# Patient Record
Sex: MALE | Race: WHITE | Employment: STUDENT | URBAN - METROPOLITAN AREA
[De-identification: names, ages, dates, MRNs, and addresses within clinical notes are randomized per-mention and may not be internally consistent; named-entity substitution may affect disease eponyms.]

---
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
## 2022-03-17 NOTE — PROGRESS NOTES
David Sutton is a 14year old male who was brought in for this visit. History was provided by Father    HPI:   Patient presents with: Allergies    Last 3 wks near daily nose bleeds - no hx of nasal trauma.  Spontenous - typically occurs after blowing Spoke with patient to verify if form is a duplicate as it has the same claim number as encounter 02/11/2022. Patient stated that the [place] would like form to include next appointment's date on it, but would like form to be filled as previously. No further questions.   Grandfather    • Cancer Maternal Grandfather        Current Medications    Current Outpatient Medications on File Prior to Visit:  Clindamycin Phosphate 1 % External Swab USE ONCE D ON CHEST AND BACK Disp:  Rfl: 5   Glucose Blood (ADAIR CONTOUR NEXT TEST) Neck supple. No tenderness is present. Cardiovascular: Normal rate, regular rhythm, S1 normal and S2 normal.  No murmur noted. Pulmonary/Chest: Effort normal. No retracting. Nontachypneic. Clear to auscultation. Good aeration throughout.      Skin: S and dust mite allergies:  · Keep indoor humidity at ~30-40%; higher levels can breed dust mites  · Keep stuffed animals to a minimum; wash in hot water every month if that is possible.  If not, tie them up in an airtight bag for 3 days or place them in a fa

## 2022-07-25 DIAGNOSIS — E10.9 TYPE 1 DIABETES MELLITUS WITHOUT COMPLICATION (CMD): Primary | ICD-10-CM

## 2022-07-25 RX ORDER — INSULIN ASPART 100 [IU]/ML
INJECTION, SOLUTION INTRAVENOUS; SUBCUTANEOUS
Qty: 90 ML | Refills: 3 | Status: SHIPPED | OUTPATIENT
Start: 2022-07-25 | End: 2022-12-21

## 2022-12-07 DIAGNOSIS — E10.9 TYPE 1 DIABETES MELLITUS WITHOUT COMPLICATION (CMD): Primary | ICD-10-CM

## 2022-12-08 RX ORDER — CALCIUM CARB/VITAMIN D3/VIT K1 500-100-40
TABLET,CHEWABLE ORAL
Qty: 100 EACH | Refills: 0 | Status: SHIPPED | OUTPATIENT
Start: 2022-12-08

## 2022-12-09 PROBLEM — E10.65 TYPE 1 DIABETES MELLITUS WITH HYPERGLYCEMIA  (CMD): Status: ACTIVE | Noted: 2022-12-09

## 2022-12-12 ENCOUNTER — APPOINTMENT (OUTPATIENT)
Dept: ENDOCRINOLOGY | Age: 22
End: 2022-12-12

## 2022-12-17 DIAGNOSIS — E10.9 TYPE 1 DIABETES MELLITUS WITHOUT COMPLICATION (CMD): ICD-10-CM

## 2022-12-20 ENCOUNTER — TELEPHONE (OUTPATIENT)
Dept: PEDIATRIC ENDOCRINOLOGY | Age: 22
End: 2022-12-20

## 2022-12-21 RX ORDER — INSULIN ASPART 100 [IU]/ML
INJECTION, SOLUTION INTRAVENOUS; SUBCUTANEOUS
Qty: 40 ML | Refills: 0 | Status: SHIPPED | OUTPATIENT
Start: 2022-12-21 | End: 2023-01-13

## 2022-12-29 ENCOUNTER — OFFICE VISIT (OUTPATIENT)
Dept: ENDOCRINOLOGY | Age: 22
End: 2022-12-29

## 2022-12-29 ENCOUNTER — LAB SERVICES (OUTPATIENT)
Dept: ENDOCRINOLOGY | Age: 22
End: 2022-12-29

## 2022-12-29 VITALS
WEIGHT: 188.49 LBS | HEART RATE: 84 BPM | DIASTOLIC BLOOD PRESSURE: 80 MMHG | SYSTOLIC BLOOD PRESSURE: 127 MMHG | TEMPERATURE: 98.6 F

## 2022-12-29 DIAGNOSIS — Z96.41 INSULIN PUMP IN PLACE: ICD-10-CM

## 2022-12-29 DIAGNOSIS — E10.65 TYPE 1 DIABETES MELLITUS WITH HYPERGLYCEMIA (CMD): ICD-10-CM

## 2022-12-29 DIAGNOSIS — E10.65 TYPE 1 DIABETES MELLITUS WITH HYPERGLYCEMIA (CMD): Primary | ICD-10-CM

## 2022-12-29 LAB — HBA1C MFR BLD: 6.9 % (ref 4.5–5.6)

## 2022-12-29 PROCEDURE — 82043 UR ALBUMIN QUANTITATIVE: CPT | Performed by: INTERNAL MEDICINE

## 2022-12-29 PROCEDURE — 80061 LIPID PANEL: CPT | Performed by: INTERNAL MEDICINE

## 2022-12-29 PROCEDURE — 36416 COLLJ CAPILLARY BLOOD SPEC: CPT | Performed by: INTERNAL MEDICINE

## 2022-12-29 PROCEDURE — 95251 CONT GLUC MNTR ANALYSIS I&R: CPT | Performed by: INTERNAL MEDICINE

## 2022-12-29 PROCEDURE — 84443 ASSAY THYROID STIM HORMONE: CPT | Performed by: INTERNAL MEDICINE

## 2022-12-29 PROCEDURE — 80053 COMPREHEN METABOLIC PANEL: CPT | Performed by: INTERNAL MEDICINE

## 2022-12-29 PROCEDURE — 36415 COLL VENOUS BLD VENIPUNCTURE: CPT | Performed by: INTERNAL MEDICINE

## 2022-12-29 PROCEDURE — 3074F SYST BP LT 130 MM HG: CPT | Performed by: INTERNAL MEDICINE

## 2022-12-29 PROCEDURE — 82570 ASSAY OF URINE CREATININE: CPT | Performed by: INTERNAL MEDICINE

## 2022-12-29 PROCEDURE — 99214 OFFICE O/P EST MOD 30 MIN: CPT | Performed by: INTERNAL MEDICINE

## 2022-12-29 PROCEDURE — 3079F DIAST BP 80-89 MM HG: CPT | Performed by: INTERNAL MEDICINE

## 2022-12-29 PROCEDURE — 83036 HEMOGLOBIN GLYCOSYLATED A1C: CPT | Performed by: INTERNAL MEDICINE

## 2022-12-29 RX ORDER — PROCHLORPERAZINE 25 MG/1
SUPPOSITORY RECTAL SEE ADMIN INSTRUCTIONS
COMMUNITY

## 2022-12-29 RX ORDER — GLUCAGON 3 MG/1
POWDER NASAL
Qty: 1 EACH | Refills: 1 | Status: SHIPPED | OUTPATIENT
Start: 2022-12-29

## 2022-12-29 RX ORDER — PROCHLORPERAZINE 25 MG/1
SUPPOSITORY RECTAL
COMMUNITY

## 2022-12-30 LAB
ALBUMIN SERPL-MCNC: 4.6 G/DL (ref 3.6–5.1)
ALBUMIN/GLOB SERPL: 1.4 {RATIO} (ref 1–2.4)
ALP SERPL-CCNC: 85 UNITS/L (ref 45–117)
ALT SERPL-CCNC: 28 UNITS/L
ANION GAP SERPL CALC-SCNC: 12 MMOL/L (ref 7–19)
AST SERPL-CCNC: 21 UNITS/L
BILIRUB SERPL-MCNC: 0.8 MG/DL (ref 0.2–1)
BUN SERPL-MCNC: 18 MG/DL (ref 6–20)
BUN/CREAT SERPL: 18 (ref 7–25)
CALCIUM SERPL-MCNC: 9.7 MG/DL (ref 8.4–10.2)
CHLORIDE SERPL-SCNC: 101 MMOL/L (ref 97–110)
CHOLEST SERPL-MCNC: 200 MG/DL
CHOLEST/HDLC SERPL: 2.7 {RATIO}
CO2 SERPL-SCNC: 31 MMOL/L (ref 21–32)
CREAT SERPL-MCNC: 0.99 MG/DL (ref 0.67–1.17)
CREAT UR-MCNC: 473 MG/DL
FASTING DURATION TIME PATIENT: 10 HOURS (ref 0–999)
FASTING DURATION TIME PATIENT: 10 HOURS (ref 0–999)
GFR SERPLBLD BASED ON 1.73 SQ M-ARVRAT: >90 ML/MIN
GLOBULIN SER-MCNC: 3.3 G/DL (ref 2–4)
GLUCOSE SERPL-MCNC: 137 MG/DL (ref 70–99)
HDLC SERPL-MCNC: 73 MG/DL
LDLC SERPL CALC-MCNC: 111 MG/DL
MICROALBUMIN UR-MCNC: 3.64 MG/DL
MICROALBUMIN/CREAT UR: 7.7 MG/G
NONHDLC SERPL-MCNC: 127 MG/DL
POTASSIUM SERPL-SCNC: 4.2 MMOL/L (ref 3.4–5.1)
PROT SERPL-MCNC: 7.9 G/DL (ref 6.4–8.2)
SODIUM SERPL-SCNC: 140 MMOL/L (ref 135–145)
TRIGL SERPL-MCNC: 81 MG/DL
TSH SERPL-ACNC: 2.37 MCUNITS/ML (ref 0.46–4.13)

## 2023-01-03 ENCOUNTER — TELEPHONE (OUTPATIENT)
Dept: PEDIATRIC ENDOCRINOLOGY | Age: 23
End: 2023-01-03

## 2023-01-13 DIAGNOSIS — E10.9 TYPE 1 DIABETES MELLITUS WITHOUT COMPLICATION (CMD): ICD-10-CM

## 2023-01-13 RX ORDER — INSULIN ASPART 100 [IU]/ML
INJECTION, SOLUTION INTRAVENOUS; SUBCUTANEOUS
Qty: 110 ML | Refills: 1 | Status: SHIPPED | OUTPATIENT
Start: 2023-01-13 | End: 2023-06-26 | Stop reason: SDUPTHER

## 2023-04-21 ENCOUNTER — OFFICE VISIT (OUTPATIENT)
Dept: FAMILY MEDICINE CLINIC | Facility: CLINIC | Age: 23
End: 2023-04-21

## 2023-04-21 VITALS
SYSTOLIC BLOOD PRESSURE: 127 MMHG | DIASTOLIC BLOOD PRESSURE: 81 MMHG | OXYGEN SATURATION: 98 % | HEART RATE: 89 BPM | WEIGHT: 190.19 LBS | HEIGHT: 71.3 IN | BODY MASS INDEX: 26.33 KG/M2

## 2023-04-21 DIAGNOSIS — Z71.3 ENCOUNTER FOR DIETARY COUNSELING AND SURVEILLANCE: ICD-10-CM

## 2023-04-21 DIAGNOSIS — E10.9 TYPE 1 DIABETES MELLITUS WITHOUT COMPLICATION (HCC): Primary | ICD-10-CM

## 2023-04-21 DIAGNOSIS — Z00.00 EXAMINATION, ROUTINE, OVER 18 YEARS OF AGE: ICD-10-CM

## 2023-04-21 DIAGNOSIS — Z71.82 EXERCISE COUNSELING: ICD-10-CM

## 2023-04-24 ENCOUNTER — TELEPHONE (OUTPATIENT)
Dept: PEDIATRIC ENDOCRINOLOGY | Age: 23
End: 2023-04-24

## 2023-04-25 ENCOUNTER — CLINICAL DOCUMENTATION (OUTPATIENT)
Dept: PEDIATRIC ENDOCRINOLOGY | Age: 23
End: 2023-04-25

## 2023-04-27 ENCOUNTER — TELEPHONE (OUTPATIENT)
Dept: PEDIATRIC ENDOCRINOLOGY | Age: 23
End: 2023-04-27

## 2023-06-12 PROBLEM — E10.65 TYPE 1 DIABETES MELLITUS WITH HYPERGLYCEMIA (HCC): Status: ACTIVE | Noted: 2022-12-09

## 2023-06-26 ENCOUNTER — OFFICE VISIT (OUTPATIENT)
Dept: ENDOCRINOLOGY | Age: 23
End: 2023-06-26

## 2023-06-26 VITALS
OXYGEN SATURATION: 99 % | WEIGHT: 190.15 LBS | BODY MASS INDEX: 26.62 KG/M2 | HEIGHT: 71 IN | HEART RATE: 74 BPM | SYSTOLIC BLOOD PRESSURE: 136 MMHG | TEMPERATURE: 97.2 F | DIASTOLIC BLOOD PRESSURE: 88 MMHG

## 2023-06-26 DIAGNOSIS — E10.65 TYPE 1 DIABETES MELLITUS WITH HYPERGLYCEMIA (CMD): Primary | ICD-10-CM

## 2023-06-26 DIAGNOSIS — Z46.81 INSULIN PUMP TITRATION: ICD-10-CM

## 2023-06-26 DIAGNOSIS — Z97.8 USES SELF-APPLIED CONTINUOUS GLUCOSE MONITORING DEVICE: ICD-10-CM

## 2023-06-26 LAB — HBA1C MFR BLD: 6.8 % (ref 4.5–5.6)

## 2023-06-26 PROCEDURE — 95251 CONT GLUC MNTR ANALYSIS I&R: CPT | Performed by: INTERNAL MEDICINE

## 2023-06-26 PROCEDURE — 36416 COLLJ CAPILLARY BLOOD SPEC: CPT | Performed by: INTERNAL MEDICINE

## 2023-06-26 PROCEDURE — 3079F DIAST BP 80-89 MM HG: CPT | Performed by: INTERNAL MEDICINE

## 2023-06-26 PROCEDURE — 83036 HEMOGLOBIN GLYCOSYLATED A1C: CPT | Performed by: INTERNAL MEDICINE

## 2023-06-26 PROCEDURE — 3075F SYST BP GE 130 - 139MM HG: CPT | Performed by: INTERNAL MEDICINE

## 2023-06-26 PROCEDURE — 99214 OFFICE O/P EST MOD 30 MIN: CPT | Performed by: INTERNAL MEDICINE

## 2023-06-26 RX ORDER — INSULIN ASPART 100 [IU]/ML
INJECTION, SOLUTION INTRAVENOUS; SUBCUTANEOUS
Qty: 110 ML | Refills: 1 | Status: SHIPPED | OUTPATIENT
Start: 2023-06-26

## 2023-07-26 ENCOUNTER — EXTERNAL RECORD (OUTPATIENT)
Dept: HEALTH INFORMATION MANAGEMENT | Facility: OTHER | Age: 23
End: 2023-07-26

## 2023-09-19 NOTE — PROGRESS NOTES
Dr Jeison Mccloud Endocrine Progress Notes pt from [address] [place] [address] for abdominal pain x1 month

## 2023-09-29 ENCOUNTER — OFFICE VISIT (OUTPATIENT)
Dept: ENDOCRINOLOGY | Age: 23
End: 2023-09-29

## 2023-09-29 ENCOUNTER — TELEPHONE (OUTPATIENT)
Dept: PEDIATRIC ENDOCRINOLOGY | Age: 23
End: 2023-09-29

## 2023-09-29 VITALS
HEART RATE: 76 BPM | OXYGEN SATURATION: 99 % | WEIGHT: 189.6 LBS | HEIGHT: 71 IN | BODY MASS INDEX: 26.54 KG/M2 | DIASTOLIC BLOOD PRESSURE: 75 MMHG | TEMPERATURE: 98.9 F | SYSTOLIC BLOOD PRESSURE: 114 MMHG

## 2023-09-29 DIAGNOSIS — Z97.8 USES SELF-APPLIED CONTINUOUS GLUCOSE MONITORING DEVICE: ICD-10-CM

## 2023-09-29 DIAGNOSIS — E10.65 TYPE 1 DIABETES MELLITUS WITH HYPERGLYCEMIA (CMD): Primary | ICD-10-CM

## 2023-09-29 DIAGNOSIS — Z96.41 PRESENCE OF HYBRID CLOSED-LOOP INSULIN PUMP SYSTEM: ICD-10-CM

## 2023-09-29 LAB — HBA1C MFR BLD: 7 % (ref 4.5–5.6)

## 2023-09-29 PROCEDURE — 95251 CONT GLUC MNTR ANALYSIS I&R: CPT | Performed by: INTERNAL MEDICINE

## 2023-09-29 PROCEDURE — 3074F SYST BP LT 130 MM HG: CPT | Performed by: INTERNAL MEDICINE

## 2023-09-29 PROCEDURE — 3078F DIAST BP <80 MM HG: CPT | Performed by: INTERNAL MEDICINE

## 2023-09-29 PROCEDURE — 36416 COLLJ CAPILLARY BLOOD SPEC: CPT | Performed by: INTERNAL MEDICINE

## 2023-09-29 PROCEDURE — 99214 OFFICE O/P EST MOD 30 MIN: CPT | Performed by: INTERNAL MEDICINE

## 2023-09-29 PROCEDURE — 83036 HEMOGLOBIN GLYCOSYLATED A1C: CPT | Performed by: INTERNAL MEDICINE

## 2023-11-02 ENCOUNTER — PATIENT MESSAGE (OUTPATIENT)
Dept: FAMILY MEDICINE CLINIC | Facility: CLINIC | Age: 23
End: 2023-11-02

## 2023-11-16 ENCOUNTER — TELEPHONE (OUTPATIENT)
Dept: FAMILY MEDICINE CLINIC | Facility: CLINIC | Age: 23
End: 2023-11-16

## 2023-11-16 NOTE — TELEPHONE ENCOUNTER
Due for   DM A1c  DM eye exam  DM foot exam  Pneumo covid flu     Completed   PX 4/21/2023   Tdap due 8/28/2024    See TE 11/16/2023 mychart message letter mailed

## 2024-01-01 DIAGNOSIS — E10.9 TYPE 1 DIABETES MELLITUS WITHOUT COMPLICATION (CMD): ICD-10-CM

## 2024-01-02 RX ORDER — CALCIUM CARB/VITAMIN D3/VIT K1 500-100-40
TABLET,CHEWABLE ORAL
Qty: 100 EACH | Refills: 1 | Status: SHIPPED | OUTPATIENT
Start: 2024-01-02

## 2024-01-03 ENCOUNTER — TELEPHONE (OUTPATIENT)
Dept: ENDOCRINOLOGY | Age: 24
End: 2024-01-03

## 2024-01-03 DIAGNOSIS — E10.65 TYPE 1 DIABETES MELLITUS WITH HYPERGLYCEMIA (CMD): Primary | ICD-10-CM

## 2024-01-03 RX ORDER — INSULIN ASPART 100 [IU]/ML
INJECTION, SOLUTION INTRAVENOUS; SUBCUTANEOUS
Qty: 110 ML | Refills: 1 | Status: SHIPPED | OUTPATIENT
Start: 2024-01-03

## 2024-01-18 ENCOUNTER — APPOINTMENT (OUTPATIENT)
Dept: ENDOCRINOLOGY | Age: 24
End: 2024-01-18

## 2024-01-29 ENCOUNTER — LAB SERVICES (OUTPATIENT)
Dept: ENDOCRINOLOGY | Age: 24
End: 2024-01-29

## 2024-01-29 DIAGNOSIS — E10.65 TYPE 1 DIABETES MELLITUS WITH HYPERGLYCEMIA (CMD): ICD-10-CM

## 2024-01-29 LAB
ALBUMIN SERPL-MCNC: 4.1 G/DL (ref 3.6–5.1)
ALBUMIN/GLOB SERPL: 1.5 {RATIO} (ref 1–2.4)
ALP SERPL-CCNC: 62 UNITS/L (ref 45–117)
ALT SERPL-CCNC: 26 UNITS/L
ANION GAP SERPL CALC-SCNC: 8 MMOL/L (ref 7–19)
AST SERPL-CCNC: 21 UNITS/L
BILIRUB SERPL-MCNC: 0.9 MG/DL (ref 0.2–1)
BUN SERPL-MCNC: 14 MG/DL (ref 6–20)
BUN/CREAT SERPL: 14 (ref 7–25)
CALCIUM SERPL-MCNC: 9.3 MG/DL (ref 8.4–10.2)
CHLORIDE SERPL-SCNC: 104 MMOL/L (ref 97–110)
CHOLEST SERPL-MCNC: 191 MG/DL
CHOLEST/HDLC SERPL: 2.9 {RATIO}
CO2 SERPL-SCNC: 29 MMOL/L (ref 21–32)
CREAT SERPL-MCNC: 1.01 MG/DL (ref 0.67–1.17)
EGFRCR SERPLBLD CKD-EPI 2021: >90 ML/MIN/{1.73_M2}
FASTING DURATION TIME PATIENT: 12 HOURS (ref 0–999)
GLOBULIN SER-MCNC: 2.8 G/DL (ref 2–4)
GLUCOSE SERPL-MCNC: 264 MG/DL (ref 70–99)
HDLC SERPL-MCNC: 67 MG/DL
LDLC SERPL CALC-MCNC: 110 MG/DL
NONHDLC SERPL-MCNC: 124 MG/DL
POTASSIUM SERPL-SCNC: 4.3 MMOL/L (ref 3.4–5.1)
PROT SERPL-MCNC: 6.9 G/DL (ref 6.4–8.2)
SODIUM SERPL-SCNC: 137 MMOL/L (ref 135–145)
T4 FREE SERPL-MCNC: 0.9 NG/DL (ref 0.8–1.5)
TRIGL SERPL-MCNC: 69 MG/DL
TSH SERPL-ACNC: 1.42 MCUNITS/ML (ref 0.35–5)

## 2024-01-29 PROCEDURE — 84439 ASSAY OF FREE THYROXINE: CPT | Performed by: CLINICAL MEDICAL LABORATORY

## 2024-01-29 PROCEDURE — 80061 LIPID PANEL: CPT | Performed by: CLINICAL MEDICAL LABORATORY

## 2024-01-29 PROCEDURE — 36415 COLL VENOUS BLD VENIPUNCTURE: CPT | Performed by: INTERNAL MEDICINE

## 2024-01-29 PROCEDURE — 82570 ASSAY OF URINE CREATININE: CPT | Performed by: CLINICAL MEDICAL LABORATORY

## 2024-01-29 PROCEDURE — 84443 ASSAY THYROID STIM HORMONE: CPT | Performed by: CLINICAL MEDICAL LABORATORY

## 2024-01-29 PROCEDURE — 80053 COMPREHEN METABOLIC PANEL: CPT | Performed by: CLINICAL MEDICAL LABORATORY

## 2024-01-29 PROCEDURE — 82043 UR ALBUMIN QUANTITATIVE: CPT | Performed by: CLINICAL MEDICAL LABORATORY

## 2024-01-30 ENCOUNTER — OFFICE VISIT (OUTPATIENT)
Dept: ENDOCRINOLOGY | Age: 24
End: 2024-01-30

## 2024-01-30 VITALS
OXYGEN SATURATION: 99 % | HEIGHT: 71 IN | SYSTOLIC BLOOD PRESSURE: 113 MMHG | BODY MASS INDEX: 27.27 KG/M2 | TEMPERATURE: 98.6 F | HEART RATE: 64 BPM | DIASTOLIC BLOOD PRESSURE: 79 MMHG | WEIGHT: 194.78 LBS

## 2024-01-30 DIAGNOSIS — E10.65 TYPE 1 DIABETES MELLITUS WITH HYPERGLYCEMIA (CMD): Primary | ICD-10-CM

## 2024-01-30 LAB
CREAT UR-MCNC: 158 MG/DL
HBA1C MFR BLD: 7.2 % (ref 4.5–5.6)
MICROALBUMIN UR-MCNC: 1.4 MG/DL
MICROALBUMIN/CREAT UR: 8.9 MG/G

## 2024-01-30 RX ORDER — INSULIN PMP CART,AUT,G6/7,CNTR
EACH SUBCUTANEOUS
Qty: 75 EACH | Refills: 1 | Status: SHIPPED | OUTPATIENT
Start: 2024-01-30

## 2024-03-13 ENCOUNTER — E-ADVICE (OUTPATIENT)
Dept: ENDOCRINOLOGY | Age: 24
End: 2024-03-13

## 2024-03-13 ENCOUNTER — TELEPHONE (OUTPATIENT)
Dept: ENDOCRINOLOGY | Age: 24
End: 2024-03-13

## 2024-03-27 ENCOUNTER — TELEPHONE (OUTPATIENT)
Dept: FAMILY MEDICINE CLINIC | Facility: CLINIC | Age: 24
End: 2024-03-27

## 2024-03-27 NOTE — TELEPHONE ENCOUNTER
Patient was called by CSS due to the reason of his appointment of \"feelings of sadness\" for tomorrow    One year ago saw Dr. Adams for mental health and discussed resources  Did try therapy and wanted another opinion and discuss medications  His parents recommended that he see Dr. Lawson  No thoughts of harming self or others, no plans in place to hurt self or SI  Has family and friends as a good support group  Patient feels safe and feels can wait to be seen tomorrow by Dr. Lawson  Informed patient that he can call our office or [facility]; can also walk in to [facility] if needs to talk to someone.  Patient states does have resources to call if needs to.  Appreciated the call.  Informed patient that he can call us back or reach out via [employer] if he would like additional phone numbers that we can provide for him to speak with (hot line)  Patient verbalized understanding   And thanked this nurse

## 2024-04-29 ENCOUNTER — OFFICE VISIT (OUTPATIENT)
Dept: FAMILY MEDICINE CLINIC | Facility: CLINIC | Age: 24
End: 2024-04-29
Payer: COMMERCIAL

## 2024-04-29 VITALS
BODY MASS INDEX: 26 KG/M2 | WEIGHT: 189 LBS | DIASTOLIC BLOOD PRESSURE: 73 MMHG | HEART RATE: 69 BPM | SYSTOLIC BLOOD PRESSURE: 138 MMHG

## 2024-04-29 DIAGNOSIS — F41.8 DEPRESSION WITH ANXIETY: ICD-10-CM

## 2024-04-29 DIAGNOSIS — E10.9 TYPE 1 DIABETES MELLITUS WITHOUT COMPLICATION (HCC): Primary | ICD-10-CM

## 2024-04-29 DIAGNOSIS — E55.9 VITAMIN D DEFICIENCY: ICD-10-CM

## 2024-04-29 DIAGNOSIS — Z00.00 ENCOUNTER FOR ANNUAL HEALTH EXAMINATION: ICD-10-CM

## 2024-04-29 NOTE — PROGRESS NOTES
No chief complaint on file.    HPI:   Hany Brady is a 23 year old male who presents to clinic for DM and anxiety follow up.   Mood has improved, less mind racing, less generalized anxiety. Seeing psychiatry and cbt.  Follows with endocrine for DM t1 control, amenable to making diet changes and plans on following with dietician.       REVIEW OF SYSTEMS:   Negative, except per HPI.     EXAM:   /73 (BP Location: Left arm, Patient Position: Sitting, Cuff Size: adult)   Pulse 69   Wt 189 lb (85.7 kg)   BMI 26.36 kg/m²   Body mass index is 26.36 kg/m².  GENERAL: well developed, well nourished, in no apparent distress  SKIN: no rashes, no suspicious lesions  HEENT: atraumatic, normocephalic  EYES: PERRLA, EOMI,conjunctiva are clear  NECK: supple, no adenopathy    ASSESSMENT AND PLAN:     1. Type 1 diabetes mellitus without complication (HCC)  - controlled, sees endo routinely. Seeing dietician  - CBC With Differential With Platelet; Future  - Comp Metabolic Panel (14); Future  - Hemoglobin A1C; Future  - Lipid Panel; Future  - TSH W Reflex To Free T4; Future  - Microalb/Creat Ratio, Random Urine [E]; Future    4. Depression with anxiety  - control improved with ssri + cbt + est care w psychiatry.     CPE labs ordered early.      RTC if no improvement in symptoms. Red flags discussed to go to [facility].     Dorene Lawson MD  4/29/2024  9:08 AM+

## 2024-04-30 ENCOUNTER — APPOINTMENT (OUTPATIENT)
Dept: ENDOCRINOLOGY | Age: 24
End: 2024-04-30

## 2024-04-30 VITALS
OXYGEN SATURATION: 99 % | HEART RATE: 86 BPM | DIASTOLIC BLOOD PRESSURE: 78 MMHG | TEMPERATURE: 97.5 F | SYSTOLIC BLOOD PRESSURE: 118 MMHG | BODY MASS INDEX: 26.88 KG/M2 | HEIGHT: 71 IN | WEIGHT: 192.02 LBS

## 2024-04-30 DIAGNOSIS — Z97.8 USES SELF-APPLIED CONTINUOUS GLUCOSE MONITORING DEVICE: ICD-10-CM

## 2024-04-30 DIAGNOSIS — Z46.81 INSULIN PUMP TITRATION: ICD-10-CM

## 2024-04-30 DIAGNOSIS — E10.65 TYPE 1 DIABETES MELLITUS WITH HYPERGLYCEMIA  (CMD): Primary | ICD-10-CM

## 2024-04-30 DIAGNOSIS — Z96.41 PRESENCE OF HYBRID CLOSED-LOOP INSULIN PUMP SYSTEM: ICD-10-CM

## 2024-04-30 LAB — HBA1C MFR BLD: 7.1 % (ref 4.5–5.6)

## 2024-04-30 RX ORDER — SERTRALINE HYDROCHLORIDE 25 MG/1
1 TABLET, FILM COATED ORAL DAILY
COMMUNITY
Start: 2024-03-28

## 2024-05-03 ENCOUNTER — TELEPHONE (OUTPATIENT)
Dept: FAMILY MEDICINE CLINIC | Facility: CLINIC | Age: 24
End: 2024-05-03

## 2024-05-13 ENCOUNTER — TELEPHONE (OUTPATIENT)
Dept: ENDOCRINOLOGY | Age: 24
End: 2024-05-13

## 2024-05-21 ENCOUNTER — LAB ENCOUNTER (OUTPATIENT)
Dept: LAB | Age: 24
End: 2024-05-21
Attending: FAMILY MEDICINE

## 2024-05-21 DIAGNOSIS — E10.9 TYPE 1 DIABETES MELLITUS WITHOUT COMPLICATION (HCC): ICD-10-CM

## 2024-05-21 DIAGNOSIS — Z00.00 ENCOUNTER FOR ANNUAL HEALTH EXAMINATION: ICD-10-CM

## 2024-05-21 DIAGNOSIS — E55.9 VITAMIN D DEFICIENCY: ICD-10-CM

## 2024-05-21 LAB
ALBUMIN SERPL-MCNC: 4.5 G/DL (ref 3.2–4.8)
ALBUMIN/GLOB SERPL: 1.7 {RATIO} (ref 1–2)
ALP LIVER SERPL-CCNC: 58 U/L
ALT SERPL-CCNC: 17 U/L
ANION GAP SERPL CALC-SCNC: 4 MMOL/L (ref 0–18)
AST SERPL-CCNC: 22 U/L (ref ?–34)
BASOPHILS # BLD AUTO: 0.04 X10(3) UL (ref 0–0.2)
BASOPHILS NFR BLD AUTO: 0.7 %
BILIRUB SERPL-MCNC: 1 MG/DL (ref 0.3–1.2)
BUN BLD-MCNC: 15 MG/DL (ref 9–23)
BUN/CREAT SERPL: 14.9 (ref 10–20)
CALCIUM BLD-MCNC: 9.4 MG/DL (ref 8.7–10.4)
CHLORIDE SERPL-SCNC: 106 MMOL/L (ref 98–112)
CHOLEST SERPL-MCNC: 156 MG/DL (ref ?–200)
CO2 SERPL-SCNC: 30 MMOL/L (ref 21–32)
CREAT BLD-MCNC: 1.01 MG/DL
CREAT UR-SCNC: 172.7 MG/DL
DEPRECATED RDW RBC AUTO: 40.4 FL (ref 35.1–46.3)
EGFRCR SERPLBLD CKD-EPI 2021: 109 ML/MIN/1.73M2 (ref 60–?)
EOSINOPHIL # BLD AUTO: 0.13 X10(3) UL (ref 0–0.7)
EOSINOPHIL NFR BLD AUTO: 2.4 %
ERYTHROCYTE [DISTWIDTH] IN BLOOD BY AUTOMATED COUNT: 12.3 % (ref 11–15)
EST. AVERAGE GLUCOSE BLD GHB EST-MCNC: 146 MG/DL (ref 68–126)
FASTING PATIENT LIPID ANSWER: YES
FASTING STATUS PATIENT QL REPORTED: YES
GLOBULIN PLAS-MCNC: 2.7 G/DL (ref 2–3.5)
GLUCOSE BLD-MCNC: 125 MG/DL (ref 70–99)
HBA1C MFR BLD: 6.7 % (ref ?–5.7)
HCT VFR BLD AUTO: 45 %
HDLC SERPL-MCNC: 59 MG/DL (ref 40–59)
HGB BLD-MCNC: 15.5 G/DL
IMM GRANULOCYTES # BLD AUTO: 0.02 X10(3) UL (ref 0–1)
IMM GRANULOCYTES NFR BLD: 0.4 %
LDLC SERPL CALC-MCNC: 87 MG/DL (ref ?–100)
LYMPHOCYTES # BLD AUTO: 1.96 X10(3) UL (ref 1–4)
LYMPHOCYTES NFR BLD AUTO: 35.8 %
MCH RBC QN AUTO: 30.8 PG (ref 26–34)
MCHC RBC AUTO-ENTMCNC: 34.4 G/DL (ref 31–37)
MCV RBC AUTO: 89.3 FL
MICROALBUMIN UR-MCNC: 1 MG/DL
MICROALBUMIN/CREAT 24H UR-RTO: 5.8 UG/MG (ref ?–30)
MONOCYTES # BLD AUTO: 0.35 X10(3) UL (ref 0.1–1)
MONOCYTES NFR BLD AUTO: 6.4 %
NEUTROPHILS # BLD AUTO: 2.97 X10 (3) UL (ref 1.5–7.7)
NEUTROPHILS # BLD AUTO: 2.97 X10(3) UL (ref 1.5–7.7)
NEUTROPHILS NFR BLD AUTO: 54.3 %
NONHDLC SERPL-MCNC: 97 MG/DL (ref ?–130)
OSMOLALITY SERPL CALC.SUM OF ELEC: 292 MOSM/KG (ref 275–295)
PLATELET # BLD AUTO: 270 10(3)UL (ref 150–450)
POTASSIUM SERPL-SCNC: 4.1 MMOL/L (ref 3.5–5.1)
PROT SERPL-MCNC: 7.2 G/DL (ref 5.7–8.2)
RBC # BLD AUTO: 5.04 X10(6)UL
SODIUM SERPL-SCNC: 140 MMOL/L (ref 136–145)
TRIGL SERPL-MCNC: 48 MG/DL (ref 30–149)
TSI SER-ACNC: 1.3 MIU/ML (ref 0.55–4.78)
VIT D+METAB SERPL-MCNC: 25.5 NG/ML (ref 30–100)
VLDLC SERPL CALC-MCNC: 8 MG/DL (ref 0–30)
WBC # BLD AUTO: 5.5 X10(3) UL (ref 4–11)

## 2024-05-21 PROCEDURE — 82570 ASSAY OF URINE CREATININE: CPT

## 2024-05-21 PROCEDURE — 36415 COLL VENOUS BLD VENIPUNCTURE: CPT

## 2024-05-21 PROCEDURE — 85025 COMPLETE CBC W/AUTO DIFF WBC: CPT

## 2024-05-21 PROCEDURE — 82306 VITAMIN D 25 HYDROXY: CPT

## 2024-05-21 PROCEDURE — 84443 ASSAY THYROID STIM HORMONE: CPT

## 2024-05-21 PROCEDURE — 83036 HEMOGLOBIN GLYCOSYLATED A1C: CPT

## 2024-05-21 PROCEDURE — 82043 UR ALBUMIN QUANTITATIVE: CPT

## 2024-05-21 PROCEDURE — 80061 LIPID PANEL: CPT

## 2024-05-21 PROCEDURE — 80053 COMPREHEN METABOLIC PANEL: CPT

## 2024-05-29 ENCOUNTER — EXTERNAL RECORD (OUTPATIENT)
Dept: HEALTH INFORMATION MANAGEMENT | Facility: OTHER | Age: 24
End: 2024-05-29

## 2024-05-29 LAB — RETINOPATHY PRESENT (RTP): NO

## 2024-06-03 ENCOUNTER — OFFICE VISIT (OUTPATIENT)
Dept: FAMILY MEDICINE CLINIC | Facility: CLINIC | Age: 24
End: 2024-06-03
Payer: COMMERCIAL

## 2024-06-03 VITALS
BODY MASS INDEX: 26 KG/M2 | HEART RATE: 83 BPM | DIASTOLIC BLOOD PRESSURE: 75 MMHG | WEIGHT: 188 LBS | SYSTOLIC BLOOD PRESSURE: 119 MMHG

## 2024-06-03 DIAGNOSIS — Z23 NEED FOR PNEUMOCOCCAL VACCINE: ICD-10-CM

## 2024-06-03 DIAGNOSIS — M25.561 ACUTE PAIN OF BOTH KNEES: ICD-10-CM

## 2024-06-03 DIAGNOSIS — Z00.00 ENCOUNTER FOR ANNUAL HEALTH EXAMINATION: Primary | ICD-10-CM

## 2024-06-03 DIAGNOSIS — M25.562 ACUTE PAIN OF BOTH KNEES: ICD-10-CM

## 2024-06-03 PROCEDURE — 90677 PCV20 VACCINE IM: CPT | Performed by: FAMILY MEDICINE

## 2024-06-03 PROCEDURE — 3061F NEG MICROALBUMINURIA REV: CPT | Performed by: FAMILY MEDICINE

## 2024-06-03 PROCEDURE — 3044F HG A1C LEVEL LT 7.0%: CPT | Performed by: FAMILY MEDICINE

## 2024-06-03 PROCEDURE — 90471 IMMUNIZATION ADMIN: CPT | Performed by: FAMILY MEDICINE

## 2024-06-03 PROCEDURE — 3074F SYST BP LT 130 MM HG: CPT | Performed by: FAMILY MEDICINE

## 2024-06-03 PROCEDURE — 3078F DIAST BP <80 MM HG: CPT | Performed by: FAMILY MEDICINE

## 2024-06-03 PROCEDURE — 99395 PREV VISIT EST AGE 18-39: CPT | Performed by: FAMILY MEDICINE

## 2024-06-03 NOTE — PROGRESS NOTES
Hany Brady is a 23 year old male who presents for a complete physical exam.   HPI:   Working with a nutritionist and drinking water.   Starting massage therapy classes.   History of acute right quadriceps tear, would like to start physical therapy to help prevent knee injury as he is feeling pain in both knees.  Mood is stable.  Seeing psychiatry.  Currently taking sertraline total 50 mg daily.    Wt Readings from Last 3 Encounters:   06/03/24 188 lb (85.3 kg)   04/29/24 189 lb (85.7 kg)   03/28/24 189 lb (85.7 kg)     Body mass index is 26.22 kg/m².     Current Outpatient Medications   Medication Sig Dispense Refill    sertraline 25 MG Oral Tab Take 1 tablet (25 mg total) by mouth daily. (Patient taking differently: Take 1 tablet (25 mg total) by mouth in the morning and 1 tablet (25 mg total) before bedtime.) 30 tablet 0    BAQSIMI ONE PACK 3 MG/DOSE Nasal nasal powder       NOVOLOG 100 UNIT/ML Injection Solution INJECT 120 UNITS PER DAY CONTINOUSLY VIA INSULIN PUMP      Insulin Regular Human (INSULIN VIA INSULIN PUMP) Inject into the skin 4 (four) times daily before meals and nightly.      GLUCAGON EMERGENCY 1 MG Injection Kit USE 0.5 MG ONCE UTD  0    Insulin Syringe 31G X 5/16\" 0.3 ML Does not apply Misc USE D UTD  3    Glucose Blood (ADAIR CONTOUR NEXT TEST) In Vitro Strip TEST UTD 10 TIMES A DAY  2    BD PEN NEEDLE SHORT U/F 31G X 8 MM Does not apply Misc USE 6 TIMES PER DAY  6      Past Medical History:    Concussion    10/15 - no LOC, no ER visit, 2nd mild 3/16 - no LOC    Type 1 diabetes mellitus (HCC)    Varicella    disease at 18 months      History reviewed. No pertinent surgical history.   Family History   Problem Relation Age of Onset    Hypertension Father     Psychiatric Father     Diabetes Father     Other (hip dysplasia) Sister     Other (hip dysplasia) Other         family h    Colon Cancer Other         family h/o Mom' side    Glaucoma Paternal Grandfather     Macular degeneration  Paternal Grandfather     Cancer Paternal Grandfather     Cancer Maternal Grandfather     Heart Disorder Maternal Grandfather 82        pacemaker      Social History:  Social History     Socioeconomic History    Marital status: Single   Tobacco Use    Smoking status: Never     Passive exposure: Never    Smokeless tobacco: Never   Vaping Use    Vaping status: Never Used   Substance and Sexual Activity    Alcohol use: No    Drug use: No           REVIEW OF SYSTEMS:   Negative, except per HPI.     EXAM:   /75   Pulse 83   Wt 188 lb (85.3 kg)   BMI 26.22 kg/m²     GENERAL: well developed, well nourished, in no apparent distress  SKIN: no rashes, no suspicious lesions  HEENT: atraumatic, normocephalic, ears are clear  EYES: PERRLA, EOMI, conjunctiva are clear  NECK: supple, no adenopathy  LUNGS: clear to auscultation  CARDIO: RRR without murmur  GI: good BS, no masses, HSM or tenderness  MUSCULOSKELETAL: back is not tender, FROM of the back  EXTREMITIES: no cyanosis, clubbing or edema  NEURO: Oriented times three, cranial nerves are intact, motor and sensory are grossly intact    ASSESSMENT AND PLAN:   Hany Brady is a 23 year old male who presents for a complete physical exam.  1. Encounter for annual health examination  -Medical, surgical and social history, as well as medications and allergies were reviewed with patient.  Labs reviewed with patient done prior to appointment    2. Need for pneumococcal vaccine  - Prevnar 20 (PCV20) [39220]    3. Acute pain of both knees  - Physical Therapy Referral - [facility] [place]      RTC if no improvement in symptoms. Red flags discussed to go to ER.      Dorene Lawson MD  6/3/2024  10:02 AM

## 2024-06-19 ENCOUNTER — TELEPHONE (OUTPATIENT)
Dept: PEDIATRIC ENDOCRINOLOGY | Age: 24
End: 2024-06-19

## 2024-06-19 DIAGNOSIS — E10.65 TYPE 1 DIABETES MELLITUS WITH HYPERGLYCEMIA  (CMD): ICD-10-CM

## 2024-06-19 RX ORDER — INSULIN ASPART 100 [IU]/ML
INJECTION, SOLUTION INTRAVENOUS; SUBCUTANEOUS
Qty: 110 ML | Refills: 1 | Status: SHIPPED | OUTPATIENT
Start: 2024-06-19

## 2024-07-02 ENCOUNTER — TELEPHONE (OUTPATIENT)
Dept: PHYSICAL THERAPY | Facility: HOSPITAL | Age: 24
End: 2024-07-02

## 2024-07-06 DIAGNOSIS — E10.65 TYPE 1 DIABETES MELLITUS WITH HYPERGLYCEMIA  (CMD): ICD-10-CM

## 2024-07-09 ENCOUNTER — E-ADVICE (OUTPATIENT)
Dept: ENDOCRINOLOGY | Age: 24
End: 2024-07-09

## 2024-07-09 RX ORDER — INSULIN ASPART 100 [IU]/ML
INJECTION, SOLUTION INTRAVENOUS; SUBCUTANEOUS
Qty: 110 ML | Refills: 1 | OUTPATIENT
Start: 2024-07-09

## 2024-07-16 ENCOUNTER — TELEPHONE (OUTPATIENT)
Dept: PHYSICAL THERAPY | Facility: HOSPITAL | Age: 24
End: 2024-07-16

## 2024-07-16 NOTE — PROGRESS NOTES
LOWER EXTREMITY EVALUATION:     Diagnosis:   Acute pain of both knees (M25.561,M25.562)       Referring Provider: Dorene Lawson  Date of Evaluation:    7/17/2024    Precautions:   Type 1 Diabetes Next MD visit:   none scheduled  Date of Surgery: n/a     PATIENT SUMMARY   Hany Brady is a 24 year old male who presents to therapy today with complaints of bilateral knee pain, R>L. Pt had a right quad tear a few years ago during soccer; didn't realize it was torn for a few weeks or months. Pt is typically very active playing soccer, running, and lifting. Has eased off due to the increasing knee pain. Also reports increased discomfort with prolonged sitting. Insidious onset of pain was about a year ago without a specific FERNANDEZ. Pt knows his hip and knee muscles are very tight.   Pt denies popping, clicking, and buckling in the knees. Pt denies any swelling.     Pt describes pain level current 0/10, at best 0/10, at worst 7/10.   Current functional limitations include playing soccer, running, lifting, prolonged sitting, driving, and when bent over or in a squat.     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 Pt goals include \"My expectation is to follow a plan of care that helps alleviate me from my symptoms, and be given instruction how to do that in my own time. My ultimate goal is to return to exercising and leg bending activities with as little knee pain as possible.\"  Past medical history was reviewed with Hany. Significant findings include  has a past medical history of Concussion, Type 1 diabetes mellitus (HCC), and Varicella.     ASSESSMENT  Hany presents to physical therapy evaluation with primary c/o bilateral knee pain, R>L. The results of the objective tests and measures show diffuse infrapatellar swelling, mild ROM limitations in the right knee, marked LE muscle  tightness, and positive Lachman on the right and anterior drawer bilaterally.  Functional deficits include but are not limited to soccer, running, lifting, prolonged sitting, driving, and when bent over or in a squat. Signs and symptoms are consistent with diagnosis of patellofemoral dysfunction and possible chronic ACL tear. Pt and PT discussed evaluation findings, pathology, POC and HEP.  Pt voiced understanding and performs HEP correctly without reported pain. Skilled Physical Therapy is medically necessary to address the above impairments and reach functional goals.     OBJECTIVE:   Observation: mild swelling at the medial joint line on the right knee compared to the left knee.   Palpation: diffuse swelling infrapatellar region on the right; no marked tenderness   Sensation: denies numbness and tingling    AROM: (* denotes performed with pain)  Knee   Flexion: R 127; L 131  Extension: R +1; L +1         Accessory motion: limited superior patellar glide on R, hypermobile inferior and medial glides; limited superior patellar glide on L and hypermobility otherwise     Flexibility:  Hip Flexor: R mod, L mod  Hamstrings: R 46 SLR; L 54 SLR  Quads: R mod**; L mod*    Strength/MMT: (* denotes performed with pain)  Hip Knee   Flexion: R 5/5* (quad straining); L 5/5  Extension: R 5/5 (fasciculations); L 4+/5 (fasciculations)  Abduction: R 5/5; L 5/5  ER: R 4+/5; L 5-/5    (Fasciculations occurred on contralateral leg with MMT) Flexion: R 5-/5* (Straining in HS); L 5-/5* (straining in HS)  Extension: R 5/5 (fasciculations); L 5/5 (fasciculations)          Special tests:   Instability  Lachman: R +; L -  Anterior drawer: R +; L +  Posterior drawer: R -; L -  Valgus Stress: R -; L -  Varus stress: R -; L -    Meniscus  Shukri: R -; L -    Patellofemoral   Patellar apprehension: R -; L -  Patellar tilt: R -; L -    Gait: pt ambulates on level ground with normal mechanics  Balance: SLS: R >30 sec, L >30 sec    Today’s  Treatment and Response:   Pt education was provided on exam findings, treatment diagnosis, treatment plan, expectations, and prognosis. Pt was also provided recommendations for activity modifications, possible soreness after evaluation, modalities as needed [ice/heat], and importance of remaining active.  Patient was instructed in and issued a HEP for: KRBQHZDT  - Hip Flexion  - 2-3 x daily - 7 x weekly - 10 reps  - Supine Quad Set  - 2 x daily - 7 x weekly - 10 reps - 5-10 sec hold  - Seated Hamstring Set  - 2 x daily - 7 x weekly - 10 reps - 5-10 sec hold  - Seated Hamstring Stretch  - 3-4 x daily - 7 x weekly - 4 sets - 10 sec hold    Charges: PT Eval Low Complexity, 1 TE      Total Timed Treatment: 15 min     Total Treatment Time: 43 min     Based on clinical rationale and outcome measures, this evaluation involved Low Complexity decision making due to 1-2 personal factors/comorbidities, 3 body structures involved/activity limitations, and evolving symptoms including changing pain levels.  PLAN OF CARE:    Goals: (to be met in 10-12 visits)  Pt will improve knee AROM flexion to >130 degrees to improve ability to perform soccer.   Pt will improve quad strength to 5/5 to ascend 1 flight of stairs reciprocally without UE assist   Pt will increase hip and knee strength to grossly 5/5 to improve stability in the knees to perform squats.  Pt will demonstrate squatting activities without excessive femoral IR/ADD and minimal to no knee pain.  Pt will be independent and compliant with comprehensive HEP to maintain progress achieved in PT      Frequency / Duration: Patient will be seen for 1-2 x/week or a total of 10-12 visits over a 90 day period. Treatment will include: Manual Therapy, Neuromuscular Re-education, Therapeutic Exercise, and Home Exercise Program instruction    Education or treatment limitation: None  Rehab Potential:good    LEFS Score  LEFS Score: 68.75 % (7/16/2024 11:05 AM)      Patient/Family/Caregiver  was advised of these findings, precautions, and treatment options and has agreed to actively participate in planning and for this course of care.    Thank you for your referral. Please co-sign or sign and return this letter via fax as soon as possible to [phone]. If you have any questions, please contact me at Dept: [phone]    Sincerely,  Electronically signed by therapist: Kendall Wise PT  Physician's certification required: Yes  I certify the need for these services furnished under this plan of treatment and while under my care.    X___________________________________________________ Date____________________    Certification From: 7/16/2024  To:10/14/2024

## 2024-07-17 ENCOUNTER — OFFICE VISIT (OUTPATIENT)
Dept: PHYSICAL THERAPY | Facility: HOSPITAL | Age: 24
End: 2024-07-17
Attending: FAMILY MEDICINE
Payer: COMMERCIAL

## 2024-07-17 DIAGNOSIS — M25.561 ACUTE PAIN OF BOTH KNEES: Primary | ICD-10-CM

## 2024-07-17 DIAGNOSIS — M25.562 ACUTE PAIN OF BOTH KNEES: Primary | ICD-10-CM

## 2024-07-17 PROCEDURE — 97161 PT EVAL LOW COMPLEX 20 MIN: CPT

## 2024-07-17 PROCEDURE — 97110 THERAPEUTIC EXERCISES: CPT

## 2024-07-22 NOTE — PROGRESS NOTES
Diagnosis:   Acute pain of both knees (M25.561,M25.562)       Referring Provider: Dorene Lawson  Date of Evaluation:    7/17/2024    Precautions:   Type 1 Diabetes Next MD visit:   none scheduled  Date of Surgery: n/a     Insurance Primary/Secondary: [employer] [place] PPO / N/A     # Auth Visits: n/a            Subjective: Pt said he felt good after last time. Has been doing the exercises 1x a day at least. Played soccer Sat and Sun didn't feel too bad after. Felt more soreness in the R knee but not as constant and symptoms did not persist as long as usual.  Last MRI was March of 2022.  Pain: 0/10      Objective: Rx: see flowsheet    AROM: (* denotes performed with pain)  Knee   Flexion: R 127; L 131  Extension: R +1; L +1         Accessory motion: limited superior patellar glide on R, hypermobile inferior and medial glides; limited superior patellar glide on L and hypermobility otherwise     Flexibility:  Hip Flexor: R mod, L mod  Hamstrings: R 46 SLR; L 54 SLR  Quads: R mod**; L mod*    Strength/MMT: (* denotes performed with pain)  Hip Knee   Flexion: R 5/5* (quad straining); L 5/5  Extension: R 5/5 (fasciculations); L 4+/5 (fasciculations)  Abduction: R 5/5; L 5/5  ER: R 4+/5; L 5-/5    (Fasciculations occurred on contralateral leg with MMT) Flexion: R 5-/5* (Straining in HS); L 5-/5* (straining in HS)  Extension: R 5/5 (fasciculations); L 5/5 (fasciculations)        Special tests:   Lachman: R +; L -  Anterior drawer: R +; L +      Assessment: Pt responded well to use of IASTM to bilateral quad and HS's. More tension noted along the left quad vs the right, but notable tension along bilateral hamstrings more toward the mid posterior thigh. Less fasciculations noted with muscle activation today, but primarily focused on gentle stretching and ROM with focus on motor control than strengthening.       Goals:   Pt will improve knee AROM flexion to >130 degrees to improve ability to perform soccer.   Pt will improve quad  strength to 5/5 to ascend 1 flight of stairs reciprocally without UE assist   Pt will increase hip and knee strength to grossly 5/5 to improve stability in the knees to perform squats.  Pt will demonstrate squatting activities without excessive femoral IR/ADD and minimal to no knee pain.  Pt will be independent and compliant with comprehensive HEP to maintain progress achieved in PT     Plan: Patient will be seen for 1-2 x/week or a total of 10-12 visits over a 90 day period. Treatment will include: Manual Therapy, Neuromuscular Re-education, Therapeutic Exercise, and Home Exercise Program instruction  Date: 7/23/2024  TX#: 2/10-12 Date:                 TX#: 3/ Date:                 TX#: 4/ Date:                 TX#: 5/ Date:   Tx#: 6/   MT x25'  Patellar mobilizations: superior GII-III  IASTM bilat quads and HS    TE x13'  Prone quad stretch 10x10\" bilat  Supine HS stretch with green strap (HELD)  Seated HS stretch 10x10\" bilat  Gastroc/Soleus stretching on slant board 5x10\" ea    NR x10'  Prone knee flexion with ball between ankles 2x10   Standing TKE RTB (25-50% activation) 5\" hold x10 bilat       HEP: KRBQHZDT  - Hip Flexion  - 2-3 x daily - 7 x weekly - 10 reps  - Supine Quad Set  - 2 x daily - 7 x weekly - 10 reps - 5-10 sec hold  - Seated Hamstring Set  - 2 x daily - 7 x weekly - 10 reps - 5-10 sec hold  - Seated Hamstring Stretch  - 3-4 x daily - 7 x weekly - 4 sets - 10 sec hold    Charges: 2 MT 1 NR 1 TE       Total Timed Treatment: 48 min  Total Treatment Time: 48 min

## 2024-07-23 ENCOUNTER — OFFICE VISIT (OUTPATIENT)
Dept: PHYSICAL THERAPY | Facility: HOSPITAL | Age: 24
End: 2024-07-23
Attending: FAMILY MEDICINE
Payer: COMMERCIAL

## 2024-07-23 PROCEDURE — 97140 MANUAL THERAPY 1/> REGIONS: CPT

## 2024-07-23 PROCEDURE — 97110 THERAPEUTIC EXERCISES: CPT

## 2024-07-23 PROCEDURE — 97112 NEUROMUSCULAR REEDUCATION: CPT

## 2024-07-24 NOTE — PROGRESS NOTES
Diagnosis:   Acute pain of both knees (M25.561,M25.562)       Referring Provider: Dorene Lawson  Date of Evaluation:    7/17/2024    Precautions:   Type 1 Diabetes Next MD visit:   none scheduled  Date of Surgery: n/a     Insurance Primary/Secondary: [employer] [place] / N/A     # Auth Visits: n/a            Subjective: Pt said he felt fine after last session.   Last MRI was March of 2022.  Pain: 0/10      Objective: Rx: see flowsheet    AROM: (* denotes performed with pain)  Knee   Flexion: R 127; L 131  Extension: R +1; L +1         Accessory motion: limited superior patellar glide on R, hypermobile inferior and medial glides; limited superior patellar glide on L and hypermobility otherwise     Flexibility:  Hip Flexor: R mod, L mod  Hamstrings: R 46 SLR; L 54 SLR  Quads: R mod**; L mod*    Strength/MMT: (* denotes performed with pain)  Hip Knee   Flexion: R 5/5* (quad straining); L 5/5  Extension: R 5/5 (fasciculations); L 4+/5 (fasciculations)  Abduction: R 5/5; L 5/5  ER: R 4+/5; L 5-/5    (Fasciculations occurred on contralateral leg with MMT) Flexion: R 5-/5* (Straining in HS); L 5-/5* (straining in HS)  Extension: R 5/5 (fasciculations); L 5/5 (fasciculations)        Special tests:   Lachman: R +; L -  Anterior drawer: R +; L +      Assessment: Ongoing fasciculations with quad/HS activation, though appears to be decreased since the initial visit. Emphasis on breathing with controlled muscle activation with exercises today to improve motor control and endurance.       Goals:   Pt will improve knee AROM flexion to >130 degrees to improve ability to perform soccer.   Pt will improve quad strength to 5/5 to ascend 1 flight of stairs reciprocally without UE assist   Pt will increase hip and knee strength to grossly 5/5 to improve stability in the knees to perform squats.  Pt will demonstrate squatting activities without excessive femoral IR/ADD and minimal to no knee pain.  Pt will be independent and compliant with  comprehensive HEP to maintain progress achieved in PT     Plan: Patient will be seen for 1-2 x/week or a total of 10-12 visits over a 90 day period. Treatment will include: Manual Therapy, Neuromuscular Re-education, Therapeutic Exercise, and Home Exercise Program instruction  Date: 7/23/2024  TX#: 2/10-12 Date: 7/25/24                TX#: 3/10-12 Date:                 TX#: 4/ Date:                 TX#: 5/ Date:   Tx#: 6/   MT x25'  Patellar mobilizations: superior GII-III  IASTM bilat quads and HS    TE x13'  Prone quad stretch 10x10\" bilat  Supine HS stretch with green strap (HELD)  Seated HS stretch 10x10\" bilat  Gastroc/Soleus stretching on slant board 5x10\" ea    NR x10'  Prone knee flexion with ball between ankles 2x10   Standing TKE RTB (25-50% activation) 5\" hold x10 bilat MT x10'  Seated tib-fem traction GII bilat  Patellar mobilizations: all directions GII-III bilat    TE x23'  Bridge 2x10  Hook lying piriformis stretch 4x10\" bilat  Hook lying figure 4 stretch 4x10\" bilat  1/2 knee hip flexor stretch (HELD)  Mod 1/2 kneel on plinth 4x10\" bilat  Shuttle SLP 42# x12    NR x12'  SAQ 2x10 bilat   SL clam x10 bilat (slow and controlled with breathing)  Prone knee flexion with ball between ankles 2x10       HEP: KRBQHZDT  - Hip Flexion  - 2-3 x daily - 7 x weekly - 10 reps  - Supine Quad Set  - 2 x daily - 7 x weekly - 10 reps - 5-10 sec hold  - Seated Hamstring Set  - 2 x daily - 7 x weekly - 10 reps - 5-10 sec hold  - Seated Hamstring Stretch  - 3-4 x daily - 7 x weekly - 4 sets - 10 sec hold  - Supine Piriformis Stretch with Foot on Ground  - 2-3 x daily - 7 x weekly - 3 sets - 10 sec hold  - Supine Figure 4 Piriformis Stretch  - 2-3 x daily - 7 x weekly - 3 sets - 10 sec hold  - Seated Piriformis Stretch  - 2-3 x daily - 7 x weekly - 3 sets - 10 sec hold  - Seated Piriformis Stretch with Trunk Bend  - 2-3 x daily - 7 x weekly - 3 sets - 10 sec hold    Charges: 1 MT 1 NR 2 TE       Total Timed Treatment: 45  min  Total Treatment Time: 45 min

## 2024-07-25 ENCOUNTER — OFFICE VISIT (OUTPATIENT)
Dept: PHYSICAL THERAPY | Facility: HOSPITAL | Age: 24
End: 2024-07-25
Attending: FAMILY MEDICINE
Payer: COMMERCIAL

## 2024-07-25 PROCEDURE — 97140 MANUAL THERAPY 1/> REGIONS: CPT

## 2024-07-25 PROCEDURE — 97112 NEUROMUSCULAR REEDUCATION: CPT

## 2024-07-25 PROCEDURE — 97110 THERAPEUTIC EXERCISES: CPT

## 2024-07-26 NOTE — PROGRESS NOTES
Diagnosis:   Acute pain of both knees (M25.561,M25.562)       Referring Provider: Dorene Lawson  Date of Evaluation:    7/17/2024    Precautions:   Type 1 Diabetes Next MD visit:   none scheduled  Date of Surgery: n/a     Insurance Primary/Secondary: [employer] [place] P[place] / N/A     # Auth Visits: n/a            Subjective: Pt said he felt less knee pain than normal after playing soccer yesterday. Playing was fine, but felt discomfort in the knees after due to tightness.   Last MRI was March of 2022.  Pain: 0/10      Objective: Rx: see flowsheet    AROM: (* denotes performed with pain)  Knee   Flexion: R 127; L 131  Extension: R +1; L +1         Accessory motion: limited superior patellar glide on R, hypermobile inferior and medial glides; limited superior patellar glide on L and hypermobility otherwise     Flexibility:  Hip Flexor: R mod, L mod  Hamstrings: R 46 SLR; L 54 SLR  Quads: R mod**; L mod*    Strength/MMT: (* denotes performed with pain)  Hip Knee   Flexion: R 5/5* (quad straining); L 5/5  Extension: R 5/5 (fasciculations); L 4+/5 (fasciculations)  Abduction: R 5/5; L 5/5  ER: R 4+/5; L 5-/5    (Fasciculations occurred on contralateral leg with MMT) Flexion: R 5-/5* (Straining in HS); L 5-/5* (straining in HS)  Extension: R 5/5 (fasciculations); L 5/5 (fasciculations)        Special tests:   Lachman: R +; L -  Anterior drawer: R +; L +      Assessment: Pt responded well to contract-relax for muscle relaxation and stretching. Reviewed squatting form, he demonstrated increased weight bearing toward the right but was able to correct with cues. Pt did well with closed chain strengthening today- will continue to progress as able.      Goals:   Pt will improve knee AROM flexion to >130 degrees to improve ability to perform soccer.   Pt will improve quad strength to 5/5 to ascend 1 flight of stairs reciprocally without UE assist   Pt will increase hip and knee strength to grossly 5/5 to improve stability in the knees  to perform squats.  Pt will demonstrate squatting activities without excessive femoral IR/ADD and minimal to no knee pain.  Pt will be independent and compliant with comprehensive HEP to maintain progress achieved in PT     Plan: Patient will be seen for 1-2 x/week or a total of 10-12 visits over a 90 day period. Treatment will include: Manual Therapy, Neuromuscular Re-education, Therapeutic Exercise, and Home Exercise Program instruction  Mini ayesha hartman with slider   Date: 7/23/2024  TX#: 2/10-12 Date: 7/25/24                TX#: 3/10-12 Date: 7/29/24                TX#: 4/10-12 Date:                 TX#: 5/ Date:   Tx#: 6/   MT x25'  Patellar mobilizations: superior GII-III  IASTM bilat quads and HS    TE x13'  Prone quad stretch 10x10\" bilat  Supine HS stretch with green strap (HELD)  Seated HS stretch 10x10\" bilat  Gastroc/Soleus stretching on slant board 5x10\" ea    NR x10'  Prone knee flexion with ball between ankles 2x10   Standing TKE RTB (25-50% activation) 5\" hold x10 bilat MT x10'  Seated tib-fem traction GII bilat  Patellar mobilizations: all directions GII-III bilat    TE x23'  Bridge 2x10  Hook lying piriformis stretch 4x10\" bilat  Hook lying figure 4 stretch 4x10\" bilat  1/2 knee hip flexor stretch (HELD)  Mod 1/2 kneel on plinth 4x10\" bilat  Shuttle SLP 42# x12    NR x12'  SAQ 2x10 bilat   SL clam x10 bilat (slow and controlled with breathing)  Prone knee flexion with ball between ankles 2x10  MT x8'  Contract relax HS bilat, quads bilat   Passive stretching     TE x12'  Bridge 2x10  SL bridge (kick stand) x10 bilat  SB wall squat 2x10    NR x23'  Hip abd iso with bridge x10   Hip add iso with bridge x10   Prone TKE 2-3\" hold x10 bilat  STS 2x10 (functional squat)   4\" lateral step down 2x10 bilat       HEP: [id number]  - Hip Flexion  - 2-3 x daily - 7 x weekly - 10 reps  - Supine Quad Set  - 2 x daily - 7 x weekly - 10 reps - 5-10 sec hold  - Seated Hamstring Set  - 2 x daily - 7 x weekly - 10 reps -  5-10 sec hold  - Seated Hamstring Stretch  - 3-4 x daily - 7 x weekly - 4 sets - 10 sec hold  - Supine Piriformis Stretch with Foot on Ground  - 2-3 x daily - 7 x weekly - 3 sets - 10 sec hold  - Supine Figure 4 Piriformis Stretch  - 2-3 x daily - 7 x weekly - 3 sets - 10 sec hold  - Seated Piriformis Stretch  - 2-3 x daily - 7 x weekly - 3 sets - 10 sec hold  - Seated Piriformis Stretch with Trunk Bend  - 2-3 x daily - 7 x weekly - 3 sets - 10 sec hold    Charges: 1 MT 2 NR 1 TE       Total Timed Treatment: 43 min  Total Treatment Time: 43 min

## 2024-07-29 ENCOUNTER — OFFICE VISIT (OUTPATIENT)
Dept: PHYSICAL THERAPY | Facility: HOSPITAL | Age: 24
End: 2024-07-29
Attending: FAMILY MEDICINE
Payer: COMMERCIAL

## 2024-07-29 PROCEDURE — 97112 NEUROMUSCULAR REEDUCATION: CPT

## 2024-07-29 PROCEDURE — 97110 THERAPEUTIC EXERCISES: CPT

## 2024-07-29 PROCEDURE — 97140 MANUAL THERAPY 1/> REGIONS: CPT

## 2024-07-31 NOTE — PROGRESS NOTES
Diagnosis:   Acute pain of both knees (M25.561,M25.562)       Referring Provider: Dorene Lawson  Date of Evaluation:    7/17/2024    Precautions:   Type 1 Diabetes Next MD visit:   none scheduled  Date of Surgery: n/a     Insurance Primary/Secondary: [employer] P[place] / N/A     # Auth Visits: n/a            Subjective: Pt said he continues to feel stiffness and soreness after increased activities and prolonged sitting but does not feel as sharp as before.   Pain: 0/10      Objective: Rx: see flowsheet    AROM: (* denotes performed with pain)  Knee   Flexion: R 127; L 131  Extension: R +1; L +1         Accessory motion: limited superior patellar glide on R, hypermobile inferior and medial glides; limited superior patellar glide on L and hypermobility otherwise     Flexibility:  Hip Flexor: R mod, L mod  Hamstrings: R 46 SLR; L 54 SLR  Quads: R mod**; L mod*    Strength/MMT: (* denotes performed with pain)  Hip Knee   Flexion: R 5/5* (quad straining); L 5/5  Extension: R 5/5 (fasciculations); L 4+/5 (fasciculations)  Abduction: R 5/5; L 5/5  ER: R 4+/5; L 5-/5    (Fasciculations occurred on contralateral leg with MMT) Flexion: R 5-/5* (Straining in HS); L 5-/5* (straining in HS)  Extension: R 5/5 (fasciculations); L 5/5 (fasciculations)        Special tests:   Lachman: R +; L -  Anterior drawer: R +; L +      Assessment: Pt is making good progress, less fasciculations demonstrated with exercising. He will benefit from plyometric training next session for force absorption and tendon loading.       Goals:   Pt will improve knee AROM flexion to >130 degrees to improve ability to perform soccer.   Pt will improve quad strength to 5/5 to ascend 1 flight of stairs reciprocally without UE assist   Pt will increase hip and knee strength to grossly 5/5 to improve stability in the knees to perform squats.  Pt will demonstrate squatting activities without excessive femoral IR/ADD and minimal to no knee pain.  Pt will be  independent and compliant with comprehensive HEP to maintain progress achieved in PT     Plan: Patient will be seen for 1-2 x/week or a total of 10-12 visits over a 90 day period. Treatment will include: Manual Therapy, Neuromuscular Re-education, Therapeutic Exercise, and Home Exercise Program instruction  Plyometrics   Date: 7/23/2024  TX#: 2/10-12 Date: 7/25/24                TX#: 3/10-12 Date: 7/29/24                TX#: 4/10-12 Date: 8/1/24                TX#: 5/10-12 Date:   Tx#: 6/   MT x25'  Patellar mobilizations: superior GII-III  IASTM bilat quads and HS    TE x13'  Prone quad stretch 10x10\" bilat  Supine HS stretch with green strap (HELD)  Seated HS stretch 10x10\" bilat  Gastroc/Soleus stretching on slant board 5x10\" ea    NR x10'  Prone knee flexion with ball between ankles 2x10   Standing TKE RTB (25-50% activation) 5\" hold x10 bilat MT x10'  Seated tib-fem traction GII bilat  Patellar mobilizations: all directions GII-III bilat    TE x23'  Bridge 2x10  Hook lying piriformis stretch 4x10\" bilat  Hook lying figure 4 stretch 4x10\" bilat  1/2 knee hip flexor stretch (HELD)  Mod 1/2 kneel on plinth 4x10\" bilat  Shuttle SLP 42# x12    NR x12'  SAQ 2x10 bilat   SL clam x10 bilat (slow and controlled with breathing)  Prone knee flexion with ball between ankles 2x10  MT x8'  Contract relax HS bilat, quads bilat   Passive stretching     TE x12'  Bridge 2x10  SL bridge (kick stand) x10 bilat  SB wall squat 2x10    NR x23'  Hip abd iso with bridge x10   Hip add iso with bridge x10   Prone TKE 2-3\" hold x10 bilat  STS 2x10 (functional squat)   4\" lateral step down 2x10 bilat   TE x30'  Stationary bike lvl 3 x5'   Shuttle DLP 75# x10; 92# x10  Shuttle SLP 42# x10; 50# x10 bilat  Shuttle hops 50#; DL x10; DL to SL land x8 ea  Heel raise off 1/2 FR 2x10   Calf stretching- as needed   Stretching: SKDRU, dom, figure 4     NR x13'  Mini bwd lunge with glider x8-10 bilat  Elevated heel squat x10  Bridge with hip  abd iso 2x10    HEP: [id number]  - Hip Flexion  - 2-3 x daily - 7 x weekly - 10 reps  - Supine Quad Set  - 2 x daily - 7 x weekly - 10 reps - 5-10 sec hold  - Seated Hamstring Set  - 2 x daily - 7 x weekly - 10 reps - 5-10 sec hold  - Seated Hamstring Stretch  - 3-4 x daily - 7 x weekly - 4 sets - 10 sec hold  - Supine Piriformis Stretch with Foot on Ground  - 2-3 x daily - 7 x weekly - 3 sets - 10 sec hold  - Supine Figure 4 Piriformis Stretch  - 2-3 x daily - 7 x weekly - 3 sets - 10 sec hold  - Seated Piriformis Stretch  - 2-3 x daily - 7 x weekly - 3 sets - 10 sec hold  - Seated Piriformis Stretch with Trunk Bend  - 2-3 x daily - 7 x weekly - 3 sets - 10 sec hold    Charges: 1 MT 2 NR 1 TE       Total Timed Treatment: 43 min  Total Treatment Time: 43 min

## 2024-08-01 ENCOUNTER — OFFICE VISIT (OUTPATIENT)
Dept: PHYSICAL THERAPY | Facility: HOSPITAL | Age: 24
End: 2024-08-01
Attending: FAMILY MEDICINE
Payer: COMMERCIAL

## 2024-08-01 PROCEDURE — 97110 THERAPEUTIC EXERCISES: CPT

## 2024-08-01 PROCEDURE — 97112 NEUROMUSCULAR REEDUCATION: CPT

## 2024-08-07 NOTE — PROGRESS NOTES
Diagnosis:   Acute pain of both knees (M25.561,M25.562)       Referring Provider: Dorene Lawson  Date of Evaluation:    7/17/2024    Precautions:   Type 1 Diabetes Next MD visit:   none scheduled  Date of Surgery: n/a     Insurance Primary/Secondary: [employer] [place] PPO / N/A     # Auth Visits: n/a            Subjective: Pt says the game on Sunday was fine- not as much post-game pain and soreness; in the gym tried weight lifting with more of a focus on motor control rather than heavy lifting. When pushing it- would feel more of the twinging pain in the knees.   Pain: 0/10      Objective: Rx: see flowsheet    AROM: (* denotes performed with pain)  Knee   Flexion: R 127; L 131  Extension: R +1; L +1         Accessory motion: limited superior patellar glide on R, hypermobile inferior and medial glides; limited superior patellar glide on L and hypermobility otherwise     Flexibility:  Hip Flexor: R mod, L mod  Hamstrings: R 46 SLR; L 54 SLR  Quads: R mod**; L mod*    Strength/MMT: (* denotes performed with pain)  Hip Knee   Flexion: R 5/5* (quad straining); L 5/5  Extension: R 5/5 (fasciculations); L 4+/5 (fasciculations)  Abduction: R 5/5; L 5/5  ER: R 4+/5; L 5-/5    (Fasciculations occurred on contralateral leg with MMT) Flexion: R 5-/5* (Straining in HS); L 5-/5* (straining in HS)  Extension: R 5/5 (fasciculations); L 5/5 (fasciculations)        Special tests:   Lachman: R +; L -  Anterior drawer: R +; L +      Assessment: Noted more fasciculations today after nordic curls and gentle plyometrics. Responds well to intermittent stretching throughout session.      Goals:   Pt will improve knee AROM flexion to >130 degrees to improve ability to perform soccer.   Pt will improve quad strength to 5/5 to ascend 1 flight of stairs reciprocally without UE assist   Pt will increase hip and knee strength to grossly 5/5 to improve stability in the knees to perform squats.  Pt will demonstrate squatting activities without excessive  femoral IR/ADD and minimal to no knee pain.  Pt will be independent and compliant with comprehensive HEP to maintain progress achieved in PT     Plan: Patient will be seen for 1-2 x/week or a total of 10-12 visits over a 90 day period. Treatment will include: Manual Therapy, Neuromuscular Re-education, Therapeutic Exercise, and Home Exercise Program instruction  Plyometrics   Date: 7/23/2024  TX#: 2/10-12 Date: 7/25/24                TX#: 3/10-12 Date: 7/29/24                TX#: 4/10-12 Date: 8/1/24                TX#: 5/10-12 Date: 8/8/24  Tx#: 6/10-12   MT x25'  Patellar mobilizations: superior GII-III  IASTM bilat quads and HS    TE x13'  Prone quad stretch 10x10\" bilat  Supine HS stretch with green strap (HELD)  Seated HS stretch 10x10\" bilat  Gastroc/Soleus stretching on slant board 5x10\" ea    NR x10'  Prone knee flexion with ball between ankles 2x10   Standing TKE RTB (25-50% activation) 5\" hold x10 bilat MT x10'  Seated tib-fem traction GII bilat  Patellar mobilizations: all directions GII-III bilat    TE x23'  Bridge 2x10  Hook lying piriformis stretch 4x10\" bilat  Hook lying figure 4 stretch 4x10\" bilat  1/2 knee hip flexor stretch (HELD)  Mod 1/2 kneel on plinth 4x10\" bilat  Shuttle SLP 42# x12    NR x12'  SAQ 2x10 bilat   SL clam x10 bilat (slow and controlled with breathing)  Prone knee flexion with ball between ankles 2x10  MT x8'  Contract relax HS bilat, quads bilat   Passive stretching     TE x12'  Bridge 2x10  SL bridge (kick stand) x10 bilat  SB wall squat 2x10    NR x23'  Hip abd iso with bridge x10   Hip add iso with bridge x10   Prone TKE 2-3\" hold x10 bilat  STS 2x10 (functional squat)   4\" lateral step down 2x10 bilat   TE x30'  Stationary bike lvl 3 x5'   Shuttle DLP 75# x10; 92# x10  Shuttle SLP 42# x10; 50# x10 bilat  Shuttle hops 50#; DL x10; DL to SL land x8 ea  Heel raise off 1/2 FR 2x10   Calf stretching- as needed   Stretching: SKDRU, dom, figure 4     NR x13'  Mini bwd lunge  with glider x8-10 bilat  Elevated heel squat x10  Bridge with hip abd iso 2x10 TE x25'  Stationary bike lvl 4 x5'   In hallway: dynamic warm up   - HS kicks, quad stretch, forward Ts  Seated HS stretch 5x10\" bilat   Prone quad stretch 5x10\" bilat   Repeated seated HS stretch   Slantboard stretch 5x10\"    NR x13'  Plyometrics:  - plyobox quick feet up up down down fwd; lateral up up down own 2x30\" ea  Fwd step down with foam rolll push (loading)   Belen Curls x8     HEP: [id number]  - Hip Flexion  - 2-3 x daily - 7 x weekly - 10 reps  - Supine Quad Set  - 2 x daily - 7 x weekly - 10 reps - 5-10 sec hold  - Seated Hamstring Set  - 2 x daily - 7 x weekly - 10 reps - 5-10 sec hold  - Seated Hamstring Stretch  - 3-4 x daily - 7 x weekly - 4 sets - 10 sec hold  - Supine Piriformis Stretch with Foot on Ground  - 2-3 x daily - 7 x weekly - 3 sets - 10 sec hold  - Supine Figure 4 Piriformis Stretch  - 2-3 x daily - 7 x weekly - 3 sets - 10 sec hold  - Seated Piriformis Stretch  - 2-3 x daily - 7 x weekly - 3 sets - 10 sec hold  - Seated Piriformis Stretch with Trunk Bend  - 2-3 x daily - 7 x weekly - 3 sets - 10 sec hold    Charges: 1 NR 2 TE       Total Timed Treatment: 38 min  Total Treatment Time: 38 min

## 2024-08-08 ENCOUNTER — OFFICE VISIT (OUTPATIENT)
Dept: PHYSICAL THERAPY | Facility: HOSPITAL | Age: 24
End: 2024-08-08
Attending: FAMILY MEDICINE
Payer: COMMERCIAL

## 2024-08-08 PROCEDURE — 97110 THERAPEUTIC EXERCISES: CPT

## 2024-08-08 PROCEDURE — 97112 NEUROMUSCULAR REEDUCATION: CPT

## 2024-08-12 NOTE — PROGRESS NOTES
Diagnosis:   Acute pain of both knees (M25.561,M25.562)       Referring Provider: Dorene Lawson  Date of Evaluation:    7/17/2024    Precautions:   Type 1 Diabetes Next MD visit:   none scheduled  Date of Surgery: n/a     Insurance Primary/Secondary: [employer] [place] PPO / N/A     # Auth Visits: n/a            Subjective: Pt has no complaints right now but is taking it easy. HS curls and quad extension machine at the gym. Is not aggravating his knees with squatting  Pain: 0/10      Objective: Rx: see flowsheet    AROM: (* denotes performed with pain)  Knee   Flexion: R 127; L 131  Extension: R +1; L +1         Accessory motion: limited superior patellar glide on R, hypermobile inferior and medial glides; limited superior patellar glide on L and hypermobility otherwise     Flexibility:  Hip Flexor: R mod, L mod  Hamstrings: R 46 SLR; L 54 SLR  Quads: R mod**; L mod*    Strength/MMT: (* denotes performed with pain)  Hip Knee   Flexion: R 5/5* (quad straining); L 5/5  Extension: R 5/5 (fasciculations); L 4+/5 (fasciculations)  Abduction: R 5/5; L 5/5  ER: R 4+/5; L 5-/5    (Fasciculations occurred on contralateral leg with MMT) Flexion: R 5-/5* (Straining in HS); L 5-/5* (straining in HS)  Extension: R 5/5 (fasciculations); L 5/5 (fasciculations)        Special tests:   Lachman: R +; L -  Anterior drawer: R +; L +      Assessment: Pt is progressing well. Noted less fasciculations today, but required cues to completely relax between reps. Progressed plyometrics and LE strengthening, will continue to progress as able.       Goals:   Pt will improve knee AROM flexion to >130 degrees to improve ability to perform soccer.   Pt will improve quad strength to 5/5 to ascend 1 flight of stairs reciprocally without UE assist   Pt will increase hip and knee strength to grossly 5/5 to improve stability in the knees to perform squats.  Pt will demonstrate squatting activities without excessive femoral IR/ADD and minimal to no knee  pain.  Pt will be independent and compliant with comprehensive HEP to maintain progress achieved in PT     Plan: Patient will be seen for 1-2 x/week or a total of 10-12 visits over a 90 day period. Treatment will include: Manual Therapy, Neuromuscular Re-education, Therapeutic Exercise, and Home Exercise Program instruction  Plyometrics; high step up/down  Date: 7/25/24                TX#: 3/10-12 Date: 7/29/24                TX#: 4/10-12 Date: 8/1/24                TX#: 5/10-12 Date: 8/8/24  Tx#: 6/10-12 Date: 8/13/24  Tx#: 7/10-12   MT x10'  Seated tib-fem traction GII bilat  Patellar mobilizations: all directions GII-III bilat    TE x23'  Bridge 2x10  Hook lying piriformis stretch 4x10\" bilat  Hook lying figure 4 stretch 4x10\" bilat  1/2 knee hip flexor stretch (HELD)  Mod 1/2 kneel on plinth 4x10\" bilat  Shuttle SLP 42# x12    NR x12'  SAQ 2x10 bilat   SL clam x10 bilat (slow and controlled with breathing)  Prone knee flexion with ball between ankles 2x10  MT x8'  Contract relax HS bilat, quads bilat   Passive stretching     TE x12'  Bridge 2x10  SL bridge (kick stand) x10 bilat  SB wall squat 2x10    NR x23'  Hip abd iso with bridge x10   Hip add iso with bridge x10   Prone TKE 2-3\" hold x10 bilat  STS 2x10 (functional squat)   4\" lateral step down 2x10 bilat   TE x30'  Stationary bike lvl 3 x5'   Shuttle DLP 75# x10; 92# x10  Shuttle SLP 42# x10; 50# x10 bilat  Shuttle hops 50#; DL x10; DL to SL land x8 ea  Heel raise off 1/2 FR 2x10   Calf stretching- as needed   Stretching: SKTC, piriformis, figure 4     NR x13'  Mini bwd lunge with glider x8-10 bilat  Elevated heel squat x10  Bridge with hip abd iso 2x10 TE x25'  Stationary bike lvl 4 x5'   In hallway: dynamic warm up   - HS kicks, quad stretch, forward Ts  Seated HS stretch 5x10\" bilat   Prone quad stretch 5x10\" bilat   Repeated seated HS stretch   Slantboard stretch 5x10\"    NR x13'  Plyometrics:  - plyobox quick feet up up down down fwd; lateral up up  down own 2x30\" ea  Fwd step down with foam rolll push (loading)   Nordic Curls x8   TE x33'  In hallway: dynamic warm up   - HS kicks, quad stretch, forward Ts  - knee to chest stretch walking   Seated HS stretch 5x10\" bilat   Prone quad stretch 5x10\" bilat   Prone HS curl RTB x10 bilat; GTB x10 bilat  Incline bridge knees straight x10; knees bent x10   Squat 2x12, 20#, 30#    NR x12'  Plyometrics:  - high knees  - butt kicks  - shuffle  Tiltboard taps x30 ea ML, AP   HEP: [id number]  - Hip Flexion  - 2-3 x daily - 7 x weekly - 10 reps  - Supine Quad Set  - 2 x daily - 7 x weekly - 10 reps - 5-10 sec hold  - Seated Hamstring Set  - 2 x daily - 7 x weekly - 10 reps - 5-10 sec hold  - Seated Hamstring Stretch  - 3-4 x daily - 7 x weekly - 4 sets - 10 sec hold  - Supine Piriformis Stretch with Foot on Ground  - 2-3 x daily - 7 x weekly - 3 sets - 10 sec hold  - Supine Figure 4 Piriformis Stretch  - 2-3 x daily - 7 x weekly - 3 sets - 10 sec hold  - Seated Piriformis Stretch  - 2-3 x daily - 7 x weekly - 3 sets - 10 sec hold  - Seated Piriformis Stretch with Trunk Bend  - 2-3 x daily - 7 x weekly - 3 sets - 10 sec hold    Charges: 1 NR 2 TE       Total Timed Treatment: 45 min  Total Treatment Time: 45 min

## 2024-08-13 ENCOUNTER — OFFICE VISIT (OUTPATIENT)
Dept: PHYSICAL THERAPY | Facility: HOSPITAL | Age: 24
End: 2024-08-13
Attending: FAMILY MEDICINE
Payer: COMMERCIAL

## 2024-08-13 PROCEDURE — 97112 NEUROMUSCULAR REEDUCATION: CPT

## 2024-08-13 PROCEDURE — 97110 THERAPEUTIC EXERCISES: CPT

## 2024-08-21 NOTE — PROGRESS NOTES
Diagnosis:   Acute pain of both knees (M25.561,M25.562)       Referring Provider: Dorene Lawson  Date of Evaluation:    7/17/2024    Precautions:   Type 1 Diabetes Next MD visit:   none scheduled  Date of Surgery: n/a     Insurance Primary/Secondary: [employer] [place] PPO / N/A     # Auth Visits: n/a            Subjective: Pt went to kick the soccer ball at his game on Sunday- kicked with his left foot- felt his right knee give and felt some discomfort since. At the time of injury was about 4-6/10. Denies obvious swelling but noticed increased redness below the patella on the right. Pt was able to walk after but was labored. Stairs have been the hardest  Pain: 2/10 R; 0/10 L      Objective: Rx: see flowsheet    + Patellar J sign bilaterally; R>L    Instability  Lachman: R laxity; L -  Anterior drawer: R + laxity; L + laxity  Posterior drawer: R -; L -  Posterior sag sign: R -; L -  Valgus Stress: R increased laxity; L mild increased laxity  Varus stress: R -; L     Meniscus  Shukri: R -; L -    Patellofemoral   Patellar apprehension: R +; L -  Patellar tilt: R +; L -    Patellar mobility  R: medial glide mod-severe hypermobility with crepitus and tender; lateral glide mod hypermobility with crepitus; superior glide limited mobility and painful; inferior glide limited mobility with crepitus  L: medial glide mod-severe hypermobility with crepitus ; lateral glide mod-severe hypermobility with crepitus ; superior glide limited mobility with crepitus; inferior glide limited mobility with crepitus    ----  AROM: (* denotes performed with pain)  Knee initial   Flexion: R 127; L 131  Extension: R +1; L +1         Accessory motion: limited superior patellar glide on R, hypermobile inferior and medial glides; limited superior patellar glide on L and hypermobility otherwise     Flexibility:  Hip Flexor: R mod, L mod  Hamstrings: R 46 SLR; L 54 SLR  Quads: R mod**; L mod*    Strength/MMT: (* denotes performed with pain)  Hip Knee    Flexion: R 5/5* (quad straining); L 5/5  Extension: R 5/5 (fasciculations); L 4+/5 (fasciculations)  Abduction: R 5/5; L 5/5  ER: R 4+/5; L 5-/5    (Fasciculations occurred on contralateral leg with MMT) Flexion: R 5-/5* (Straining in HS); L 5-/5* (straining in HS)  Extension: R 5/5 (fasciculations); L 5/5 (fasciculations)          Assessment: Pt with recent flare in symptoms during a soccer game. Assessment shows ACL and MCL laxity, but does not suggest tears. Pt with increased patellar mobility and tracking dysfunction. Right patella seated superiorly causing increased strain on the patellar tendon. Pt will benefit from referral to orthopedic doctor for additional evaluation. Pt will also benefit from use of McConnel taping for improved patellar tracking, though pt's leg were too hairy to use today. Will plan to use next session.      Goals:   Pt will improve knee AROM flexion to >130 degrees to improve ability to perform soccer.   Pt will improve quad strength to 5/5 to ascend 1 flight of stairs reciprocally without UE assist   Pt will increase hip and knee strength to grossly 5/5 to improve stability in the knees to perform squats.  Pt will demonstrate squatting activities without excessive femoral IR/ADD and minimal to no knee pain.  Pt will be independent and compliant with comprehensive HEP to maintain progress achieved in PT     Plan: Patient will be seen for 1-2 x/week or a total of 10-12 visits over a 90 day period. Treatment will include: Manual Therapy, Neuromuscular Re-education, Therapeutic Exercise, and Home Exercise Program instruction  Plyometrics; high step up/down  Date: 7/29/24                TX#: 4/10-12 Date: 8/1/24                TX#: 5/10-12 Date: 8/8/24  Tx#: 6/10-12 Date: 8/13/24  Tx#: 7/10-12 Date: 8/22/24  Tx#: 8/10-12   MT x8'  Contract relax HS bilat, quads bilat   Passive stretching     TE x12'  Bridge 2x10  SL bridge (kick stand) x10 bilat  SB wall squat 2x10    NR x23'  Hip abd iso  with bridge x10   Hip add iso with bridge x10   Prone TKE 2-3\" hold x10 bilat  STS 2x10 (functional squat)   4\" lateral step down 2x10 bilat   TE x30'  Stationary bike lvl 3 x5'   Shuttle DLP 75# x10; 92# x10  Shuttle SLP 42# x10; 50# x10 bilat  Shuttle hops 50#; DL x10; DL to SL land x8 ea  Heel raise off 1/2 FR 2x10   Calf stretching- as needed   Stretching: SKTC, piriformis, figure 4     NR x13'  Mini bwd lunge with glider x8-10 bilat  Elevated heel squat x10  Bridge with hip abd iso 2x10 TE x25'  Stationary bike lvl 4 x5'   In hallway: dynamic warm up   - HS kicks, quad stretch, forward Ts  Seated HS stretch 5x10\" bilat   Prone quad stretch 5x10\" bilat   Repeated seated HS stretch   Slantboard stretch 5x10\"    NR x13'  Plyometrics:  - plyobox quick feet up up down down fwd; lateral up up down own 2x30\" ea  Fwd step down with foam rolll push (loading)   Nordic Curls x8   TE x33'  In hallway: dynamic warm up   - HS kicks, quad stretch, forward Ts  - knee to chest stretch walking   Seated HS stretch 5x10\" bilat   Prone quad stretch 5x10\" bilat   Prone HS curl RTB x10 bilat; GTB x10 bilat  Incline bridge knees straight x10; knees bent x10   Squat 2x12, 20#, 30#    NR x12'  Plyometrics:  - high knees  - butt kicks  - shuffle  Tiltboard taps x30 ea ML, AP TE x30'  Reassessment  Attempt Stefanoonnel taping- pt legs too hairy  Educated on use of taping  Educating pt on findings    NR x10'  Hooklying hip abduction 5\" hold x20  Hip add iso with bridge 2x10   HEP: KRBQHZDT  - Hip Flexion  - 2-3 x daily - 7 x weekly - 10 reps  - Supine Quad Set  - 2 x daily - 7 x weekly - 10 reps - 5-10 sec hold  - Seated Hamstring Set  - 2 x daily - 7 x weekly - 10 reps - 5-10 sec hold  - Seated Hamstring Stretch  - 3-4 x daily - 7 x weekly - 4 sets - 10 sec hold  - Supine Piriformis Stretch with Foot on Ground  - 2-3 x daily - 7 x weekly - 3 sets - 10 sec hold  - Supine Figure 4 Piriformis Stretch  - 2-3 x daily - 7 x weekly - 3 sets - 10 sec  hold  - Seated Piriformis Stretch  - 2-3 x daily - 7 x weekly - 3 sets - 10 sec hold  - Seated Piriformis Stretch with Trunk Bend  - 2-3 x daily - 7 x weekly - 3 sets - 10 sec hold    Charges: 1 NR 2 TE       Total Timed Treatment: 40 min  Total Treatment Time: 40 min

## 2024-08-22 ENCOUNTER — OFFICE VISIT (OUTPATIENT)
Dept: PHYSICAL THERAPY | Facility: HOSPITAL | Age: 24
End: 2024-08-22
Attending: FAMILY MEDICINE
Payer: COMMERCIAL

## 2024-08-22 PROCEDURE — 97112 NEUROMUSCULAR REEDUCATION: CPT

## 2024-08-22 PROCEDURE — 97110 THERAPEUTIC EXERCISES: CPT

## 2024-08-26 ENCOUNTER — TELEPHONE (OUTPATIENT)
Dept: FAMILY MEDICINE CLINIC | Facility: CLINIC | Age: 24
End: 2024-08-26

## 2024-08-26 DIAGNOSIS — M25.562 ACUTE PAIN OF BOTH KNEES: Primary | ICD-10-CM

## 2024-08-26 DIAGNOSIS — M25.561 ACUTE PAIN OF BOTH KNEES: Primary | ICD-10-CM

## 2024-08-26 NOTE — PROGRESS NOTES
Diagnosis:   Acute pain of both knees (M25.561,M25.562)       Referring Provider: Dorene Lawson  Date of Evaluation:    7/17/2024    Precautions:   Type 1 Diabetes Next MD visit:   none scheduled  Date of Surgery: n/a     Insurance Primary/Secondary: [employer] [place] PPO / N/A     # Auth Visits: n/a            Subjective: Pt played in his soccer games this past weekend and felt okay. He was sore Friday though.   Pain: 1/10 R; 1/10 L      Objective: Rx: see flowsheet    + Patellar J sign bilaterally; R>L    Instability  Lachman: R laxity; L -  Anterior drawer: R + laxity; L + laxity  Posterior drawer: R -; L -  Posterior sag sign: R -; L -  Valgus Stress: R increased laxity; L mild increased laxity  Varus stress: R -; L     Meniscus  Shukri: R -; L -    Patellofemoral   Patellar apprehension: R +; L -  Patellar tilt: R +; L -    Patellar mobility  R: medial glide mod-severe hypermobility with crepitus and tender; lateral glide mod hypermobility with crepitus; superior glide limited mobility and painful; inferior glide limited mobility with crepitus  L: medial glide mod-severe hypermobility with crepitus ; lateral glide mod-severe hypermobility with crepitus ; superior glide limited mobility with crepitus; inferior glide limited mobility with crepitus    ----  AROM: (* denotes performed with pain)  Knee initial   Flexion: R 127; L 131  Extension: R +1; L +1         Accessory motion: limited superior patellar glide on R, hypermobile inferior and medial glides; limited superior patellar glide on L and hypermobility otherwise     Flexibility:  Hip Flexor: R mod, L mod  Hamstrings: R 46 SLR; L 54 SLR  Quads: R mod**; L mod*    Strength/MMT: (* denotes performed with pain)  Hip Knee   Flexion: R 5/5* (quad straining); L 5/5  Extension: R 5/5 (fasciculations); L 4+/5 (fasciculations)  Abduction: R 5/5; L 5/5  ER: R 4+/5; L 5-/5    (Fasciculations occurred on contralateral leg with MMT) Flexion: R 5-/5* (Straining in HS); L  5-/5* (straining in HS)  Extension: R 5/5 (fasciculations); L 5/5 (fasciculations)          Assessment: Was unable to use any tape on the pt due to the hot weather and moisture on the skin. His left knee had no issues today, but his right knee felt strained with several exercises today. He may benefit from use of a patellar stabilization brace to provide inferior and medial patellar glides and reduce strain in the patellar tendon.       Goals:   Pt will improve knee AROM flexion to >130 degrees to improve ability to perform soccer.   Pt will improve quad strength to 5/5 to ascend 1 flight of stairs reciprocally without UE assist   Pt will increase hip and knee strength to grossly 5/5 to improve stability in the knees to perform squats.  Pt will demonstrate squatting activities without excessive femoral IR/ADD and minimal to no knee pain.  Pt will be independent and compliant with comprehensive HEP to maintain progress achieved in PT     Plan: Patient will be seen for 1-2 x/week or a total of 10-12 visits over a 90 day period. Treatment will include: Manual Therapy, Neuromuscular Re-education, Therapeutic Exercise, and Home Exercise Program instruction  Plyometrics; high step up/down  Date: 8/1/24                TX#: 5/10-12 Date: 8/8/24  Tx#: 6/10-12 Date: 8/13/24  Tx#: 7/10-12 Date: 8/22/24  Tx#: 8/10-12 Date: 8/27/24  Tx#: 9/10-12   TE x30'  Stationary bike lvl 3 x5'   Shuttle DLP 75# x10; 92# x10  Shuttle SLP 42# x10; 50# x10 bilat  Shuttle hops 50#; DL x10; DL to SL land x8 ea  Heel raise off 1/2 FR 2x10   Calf stretching- as needed   Stretching: SKTC, piriformis, figure 4     NR x13'  Mini bwd lunge with glider x8-10 bilat  Elevated heel squat x10  Bridge with hip abd iso 2x10 TE x25'  Stationary bike lvl 4 x5'   In hallway: dynamic warm up   - HS kicks, quad stretch, forward Ts  Seated HS stretch 5x10\" bilat   Prone quad stretch 5x10\" bilat   Repeated seated HS stretch   Slantboard stretch 5x10\"    NR  x13'  Plyometrics:  - plyobox quick feet up up down down fwd; lateral up up down own 2x30\" ea  Fwd step down with foam rolll push (loading)   Nordic Curls x8   TE x33'  In hallway: dynamic warm up   - HS kicks, quad stretch, forward Ts  - knee to chest stretch walking   Seated HS stretch 5x10\" bilat   Prone quad stretch 5x10\" bilat   Prone HS curl RTB x10 bilat; GTB x10 bilat  Incline bridge knees straight x10; knees bent x10   Squat 2x12, 20#, 30#    NR x12'  Plyometrics:  - high knees  - butt kicks  - shuffle  Tiltboard taps x30 ea ML, AP TE x30'  Reassessment  Attempt McConnel taping- pt legs too hairy  Educated on use of taping  Educating pt on findings    NR x10'  Hooklying hip abduction 5\" hold x20  Hip add iso with bridge 2x10 TE x17'  Attempting using McConnel taping and KT tape  SL shuttle press 55# x15 bilat     NMR x23'  6\" lateral step down x20 bilat  16\" plyobox set ups x10 bilat  SL STS x10 bilat  Side stepping RTB at ankles x2 laps; GTB at ankles x2 laps     HEP: KRBQHZDT  - Hip Flexion  - 2-3 x daily - 7 x weekly - 10 reps  - Supine Quad Set  - 2 x daily - 7 x weekly - 10 reps - 5-10 sec hold  - Seated Hamstring Set  - 2 x daily - 7 x weekly - 10 reps - 5-10 sec hold  - Seated Hamstring Stretch  - 3-4 x daily - 7 x weekly - 4 sets - 10 sec hold  - Supine Piriformis Stretch with Foot on Ground  - 2-3 x daily - 7 x weekly - 3 sets - 10 sec hold  - Supine Figure 4 Piriformis Stretch  - 2-3 x daily - 7 x weekly - 3 sets - 10 sec hold  - Seated Piriformis Stretch  - 2-3 x daily - 7 x weekly - 3 sets - 10 sec hold  - Seated Piriformis Stretch with Trunk Bend  - 2-3 x daily - 7 x weekly - 3 sets - 10 sec hold    Charges: 2 NR 1 TE       Total Timed Treatment: 40 min  Total Treatment Time: 40 min

## 2024-08-27 ENCOUNTER — OFFICE VISIT (OUTPATIENT)
Dept: PHYSICAL THERAPY | Facility: HOSPITAL | Age: 24
End: 2024-08-27
Attending: FAMILY MEDICINE
Payer: COMMERCIAL

## 2024-08-27 ENCOUNTER — APPOINTMENT (OUTPATIENT)
Dept: ENDOCRINOLOGY | Age: 24
End: 2024-08-27

## 2024-08-27 PROCEDURE — 97110 THERAPEUTIC EXERCISES: CPT

## 2024-08-27 PROCEDURE — 97112 NEUROMUSCULAR REEDUCATION: CPT

## 2024-08-30 NOTE — PROGRESS NOTES
Diagnosis:   Acute pain of both knees (M25.561,M25.562)       Referring Provider: Dorene Lawson  Date of Evaluation:    7/17/2024    Precautions:   Type 1 Diabetes Next MD visit:   none scheduled  Date of Surgery: n/a     Insurance Primary/Secondary: [employer] [place] PPO / N/A     # Auth Visits: n/a            Subjective: Pt felt okay over this trip this weekend. No HS soreness, quads were a little sore especially with prolonged sitting. Hiked 9 miles and felt okay though.    Pain: 1/10 R; 0/10 L      Objective: Rx: see flowsheet    + Patellar J sign bilaterally; R>L    Instability  Lachman: R laxity; L -  Anterior drawer: R + laxity; L + laxity  Posterior drawer: R -; L -  Posterior sag sign: R -; L -  Valgus Stress: R increased laxity; L mild increased laxity  Varus stress: R -; L     Meniscus  Shukri: R -; L -    Patellofemoral   Patellar apprehension: R +; L -  Patellar tilt: R +; L -    Patellar mobility  R: medial glide mod-severe hypermobility with crepitus and tender; lateral glide mod hypermobility with crepitus; superior glide limited mobility and painful; inferior glide limited mobility with crepitus  L: medial glide mod-severe hypermobility with crepitus ; lateral glide mod-severe hypermobility with crepitus ; superior glide limited mobility with crepitus; inferior glide limited mobility with crepitus    ----  AROM: (* denotes performed with pain)  Knee initial   Flexion: R 127; L 131  Extension: R +1; L +1         Accessory motion: limited superior patellar glide on R, hypermobile inferior and medial glides; limited superior patellar glide on L and hypermobility otherwise     Flexibility:  Hip Flexor: R mod, L mod  Hamstrings: R 46 SLR; L 54 SLR  Quads: R mod**; L mod*    Strength/MMT: (* denotes performed with pain)  Hip Knee   Flexion: R 5/5* (quad straining); L 5/5  Extension: R 5/5 (fasciculations); L 4+/5 (fasciculations)  Abduction: R 5/5; L 5/5  ER: R 4+/5; L 5-/5    (Fasciculations occurred on  contralateral leg with MMT) Flexion: R 5-/5* (Straining in HS); L 5-/5* (straining in HS)  Extension: R 5/5 (fasciculations); L 5/5 (fasciculations)          Assessment: Pt had minimal complaints of discomfort today but did not increased tension and fatiguing in the right quad with plyometrics. Will work on cutting and patellar tendon loading next session.      Goals:   Pt will improve knee AROM flexion to >130 degrees to improve ability to perform soccer.   Pt will improve quad strength to 5/5 to ascend 1 flight of stairs reciprocally without UE assist   Pt will increase hip and knee strength to grossly 5/5 to improve stability in the knees to perform squats.  Pt will demonstrate squatting activities without excessive femoral IR/ADD and minimal to no knee pain.  Pt will be independent and compliant with comprehensive HEP to maintain progress achieved in PT     Plan: Patient will be seen for 1-2 x/week or a total of 10-12 visits over a 90 day period. Treatment will include: Manual Therapy, Neuromuscular Re-education, Therapeutic Exercise, and Home Exercise Program instruction  Plyometrics; high step up/down  Date: 8/8/24  Tx#: 6/10-12 Date: 8/13/24  Tx#: 7/10-12 Date: 8/22/24  Tx#: 8/10-12 Date: 8/27/24  Tx#: 9/10-12 Date: 9/3/24  Tx#: 10/12   TE x25'  Stationary bike lvl 4 x5'   In hallway: dynamic warm up   - HS kicks, quad stretch, forward Ts  Seated HS stretch 5x10\" bilat   Prone quad stretch 5x10\" bilat   Repeated seated HS stretch   Slantboard stretch 5x10\"    NR x13'  Plyometrics:  - plyobox quick feet up up down down fwd; lateral up up down own 2x30\" ea  Fwd step down with foam rolll push (loading)   Nordic Curls x8   TE x33'  In hallway: dynamic warm up   - HS kicks, quad stretch, forward Ts  - knee to chest stretch walking   Seated HS stretch 5x10\" bilat   Prone quad stretch 5x10\" bilat   Prone HS curl RTB x10 bilat; GTB x10 bilat  Incline bridge knees straight x10; knees bent x10   Squat 2x12, 20#,  30#    NR x12'  Plyometrics:  - high knees  - butt kicks  - shuffle  Tiltboard taps x30 ea ML, AP TE x30'  Reassessment  Attempt McConnel taping- pt legs too hairy  Educated on use of taping  Educating pt on findings    NR x10'  Hooklying hip abduction 5\" hold x20  Hip add iso with bridge 2x10 TE x17'  Attempting using McConnel taping and KT tape  SL shuttle press 55# x15 bilat     NMR x23'  6\" lateral step down x20 bilat  16\" plyobox set ups x10 bilat  SL STS x10 bilat  Side stepping RTB at ankles x2 laps; GTB at ankles x2 laps   TE x15'  In hallway: dynamic warm up   - HS kicks, quad stretch, forward Ts  1/2 knee hip flexor stretching on plinth 5x10\" bilat  Seated HS stretch 5x10\" bilat     NR x30'  Plyometrics:  - high knees  - butt kicks  - shuffle  - 8\" jump down x10   - 16\" jump down x10   16\" plyobox step up/down x10 bilat  Cranes 1D GTB x10 bilat   TRX SL mini squat x10 bilat  - outside- running fwd/bwd, shuffling   HEP: KRBQHZDT  - Hip Flexion  - 2-3 x daily - 7 x weekly - 10 reps  - Supine Quad Set  - 2 x daily - 7 x weekly - 10 reps - 5-10 sec hold  - Seated Hamstring Set  - 2 x daily - 7 x weekly - 10 reps - 5-10 sec hold  - Seated Hamstring Stretch  - 3-4 x daily - 7 x weekly - 4 sets - 10 sec hold  - Supine Piriformis Stretch with Foot on Ground  - 2-3 x daily - 7 x weekly - 3 sets - 10 sec hold  - Supine Figure 4 Piriformis Stretch  - 2-3 x daily - 7 x weekly - 3 sets - 10 sec hold  - Seated Piriformis Stretch  - 2-3 x daily - 7 x weekly - 3 sets - 10 sec hold  - Seated Piriformis Stretch with Trunk Bend  - 2-3 x daily - 7 x weekly - 3 sets - 10 sec hold    Charges: 2 NR 1 TE       Total Timed Treatment: 45 min  Total Treatment Time: 45 min

## 2024-09-03 ENCOUNTER — OFFICE VISIT (OUTPATIENT)
Dept: PHYSICAL THERAPY | Facility: HOSPITAL | Age: 24
End: 2024-09-03
Attending: FAMILY MEDICINE
Payer: COMMERCIAL

## 2024-09-03 PROCEDURE — 97112 NEUROMUSCULAR REEDUCATION: CPT

## 2024-09-03 PROCEDURE — 97110 THERAPEUTIC EXERCISES: CPT

## 2024-09-04 ENCOUNTER — APPOINTMENT (OUTPATIENT)
Dept: ENDOCRINOLOGY | Age: 24
End: 2024-09-04

## 2024-09-04 ENCOUNTER — MED REC SCAN ONLY (OUTPATIENT)
Dept: PEDIATRICS CLINIC | Facility: CLINIC | Age: 24
End: 2024-09-04

## 2024-09-04 VITALS
WEIGHT: 194.89 LBS | TEMPERATURE: 97.6 F | HEIGHT: 71 IN | SYSTOLIC BLOOD PRESSURE: 123 MMHG | OXYGEN SATURATION: 99 % | HEART RATE: 74 BPM | DIASTOLIC BLOOD PRESSURE: 83 MMHG | BODY MASS INDEX: 27.28 KG/M2

## 2024-09-04 DIAGNOSIS — E10.65 TYPE 1 DIABETES MELLITUS WITH HYPERGLYCEMIA  (CMD): Primary | ICD-10-CM

## 2024-09-04 DIAGNOSIS — Z46.81 INSULIN PUMP TITRATION: ICD-10-CM

## 2024-09-04 DIAGNOSIS — Z96.41 PRESENCE OF HYBRID CLOSED-LOOP INSULIN PUMP SYSTEM: ICD-10-CM

## 2024-09-04 DIAGNOSIS — Z97.8 USES SELF-APPLIED CONTINUOUS GLUCOSE MONITORING DEVICE: ICD-10-CM

## 2024-09-04 LAB — HBA1C MFR BLD: 7.9 % (ref 4.5–5.6)

## 2024-09-09 ENCOUNTER — APPOINTMENT (OUTPATIENT)
Dept: ENDOCRINOLOGY | Age: 24
End: 2024-09-09

## 2024-09-09 DIAGNOSIS — E10.65 TYPE 1 DIABETES MELLITUS WITH HYPERGLYCEMIA  (CMD): ICD-10-CM

## 2024-09-09 PROCEDURE — 83036 HEMOGLOBIN GLYCOSYLATED A1C: CPT | Performed by: CLINICAL MEDICAL LABORATORY

## 2024-09-09 PROCEDURE — 84443 ASSAY THYROID STIM HORMONE: CPT | Performed by: CLINICAL MEDICAL LABORATORY

## 2024-09-09 PROCEDURE — 80053 COMPREHEN METABOLIC PANEL: CPT | Performed by: CLINICAL MEDICAL LABORATORY

## 2024-09-09 PROCEDURE — 84439 ASSAY OF FREE THYROXINE: CPT | Performed by: CLINICAL MEDICAL LABORATORY

## 2024-09-09 PROCEDURE — 82043 UR ALBUMIN QUANTITATIVE: CPT | Performed by: CLINICAL MEDICAL LABORATORY

## 2024-09-09 PROCEDURE — 82570 ASSAY OF URINE CREATININE: CPT | Performed by: CLINICAL MEDICAL LABORATORY

## 2024-09-09 PROCEDURE — 36415 COLL VENOUS BLD VENIPUNCTURE: CPT | Performed by: INTERNAL MEDICINE

## 2024-09-09 PROCEDURE — 80061 LIPID PANEL: CPT | Performed by: CLINICAL MEDICAL LABORATORY

## 2024-09-10 DIAGNOSIS — R74.8 ELEVATED LIVER ENZYMES: Primary | ICD-10-CM

## 2024-09-10 LAB
ALBUMIN SERPL-MCNC: 3.8 G/DL (ref 3.6–5.1)
ALBUMIN/GLOB SERPL: 1.1 {RATIO} (ref 1–2.4)
ALP SERPL-CCNC: 70 UNITS/L (ref 45–117)
ALT SERPL-CCNC: 201 UNITS/L
ANION GAP SERPL CALC-SCNC: 7 MMOL/L (ref 7–19)
AST SERPL-CCNC: 515 UNITS/L
BILIRUB SERPL-MCNC: 0.7 MG/DL (ref 0.2–1)
BUN SERPL-MCNC: 15 MG/DL (ref 6–20)
BUN/CREAT SERPL: 17 (ref 7–25)
CALCIUM SERPL-MCNC: 9.6 MG/DL (ref 8.4–10.2)
CHLORIDE SERPL-SCNC: 106 MMOL/L (ref 97–110)
CHOLEST SERPL-MCNC: 192 MG/DL
CHOLEST/HDLC SERPL: 2.7 {RATIO}
CO2 SERPL-SCNC: 31 MMOL/L (ref 21–32)
CREAT SERPL-MCNC: 0.86 MG/DL (ref 0.67–1.17)
CREAT UR-MCNC: 277 MG/DL
EGFRCR SERPLBLD CKD-EPI 2021: >90 ML/MIN/{1.73_M2}
FASTING DURATION TIME PATIENT: 13 HOURS (ref 0–999)
GLOBULIN SER-MCNC: 3.4 G/DL (ref 2–4)
GLUCOSE SERPL-MCNC: 85 MG/DL (ref 70–99)
HBA1C MFR BLD: 7.6 % (ref 4.5–5.6)
HDLC SERPL-MCNC: 71 MG/DL
LDLC SERPL CALC-MCNC: 107 MG/DL
MICROALBUMIN UR-MCNC: 2.42 MG/DL
MICROALBUMIN/CREAT UR: 8.7 MG/G
NONHDLC SERPL-MCNC: 121 MG/DL
POTASSIUM SERPL-SCNC: 4.3 MMOL/L (ref 3.4–5.1)
PROT SERPL-MCNC: 7.2 G/DL (ref 6.4–8.2)
SODIUM SERPL-SCNC: 140 MMOL/L (ref 135–145)
T4 FREE SERPL-MCNC: 0.8 NG/DL (ref 0.8–1.5)
TRIGL SERPL-MCNC: 72 MG/DL
TSH SERPL-ACNC: 1.36 MCUNITS/ML (ref 0.35–5)

## 2024-09-11 NOTE — PROGRESS NOTES
Diagnosis:   Acute pain of both knees (M25.561,M25.562)       Referring Provider: Dorene Lawson  Date of Evaluation:    7/17/2024    Precautions:   Type 1 Diabetes Next MD visit:   none scheduled  Date of Surgery: n/a     Insurance Primary/Secondary: [employer] [place] P[place] / N/A     # Auth Visits: n/a            Subjective: Pt had an indoor game last night and it went well with the knees. Rolled his left ankle though.   Pain: 0/10 R; 0/10 L      Objective: Rx: see flowsheet    + Patellar J sign bilaterally; R>L    Instability  Lachman: R laxity; L -  Anterior drawer: R + laxity; L + laxity  Valgus Stress: R increased laxity; L mild increased laxity    Patellofemoral   Patellar apprehension: R +; L -  Patellar tilt: R +; L -    Patellar mobility  R: medial glide mod-severe hypermobility with crepitus and tender; lateral glide mod hypermobility with crepitus; superior glide limited mobility and painful; inferior glide limited mobility with crepitus  L: medial glide mod-severe hypermobility with crepitus ; lateral glide mod-severe hypermobility with crepitus ; superior glide limited mobility with crepitus; inferior glide limited mobility with crepitus    ----    Flexibility:  Hip Flexor: R mod, L mod  Hamstrings: R 46 SLR; L 54 SLR  Quads: R mod**; L mod*    Strength/MMT: (* denotes performed with pain)  Hip Knee   Flexion: R 5/5* (quad straining); L 5/5  Extension: R 5/5 (fasciculations); L 4+/5 (fasciculations)  Abduction: R 5/5; L 5/5  ER: R 4+/5; L 5-/5    (Fasciculations occurred on contralateral leg with MMT) Flexion: R 5-/5* (Straining in HS); L 5-/5* (straining in HS)  Extension: R 5/5 (fasciculations); L 5/5 (fasciculations)          Assessment: Focused on controlled lunging movements and avoid hip IR and add with hinging and squatting. Pt to call orthopedic office and schedule an appointment for further assessment.      Goals:   Pt will improve knee AROM flexion to >130 degrees to improve ability to perform soccer.    Pt will improve quad strength to 5/5 to ascend 1 flight of stairs reciprocally without UE assist   Pt will increase hip and knee strength to grossly 5/5 to improve stability in the knees to perform squats.  Pt will demonstrate squatting activities without excessive femoral IR/ADD and minimal to no knee pain.  Pt will be independent and compliant with comprehensive HEP to maintain progress achieved in PT     Plan: Patient will be seen for 1-2 x/week or a total of 10-12 visits over a 90 day period. Treatment will include: Manual Therapy, Neuromuscular Re-education, Therapeutic Exercise, and Home Exercise Program instruction  Date: 8/13/24  Tx#: 7/10-12 Date: 8/22/24  Tx#: 8/10-12 Date: 8/27/24  Tx#: 9/10-12 Date: 9/3/24  Tx#: 10/12 Date: 9/12/24  Tx#: 11/12   TE x33'  In hallway: dynamic warm up   - HS kicks, quad stretch, forward Ts  - knee to chest stretch walking   Seated HS stretch 5x10\" bilat   Prone quad stretch 5x10\" bilat   Prone HS curl RTB x10 bilat; GTB x10 bilat  Incline bridge knees straight x10; knees bent x10   Squat 2x12, 20#, 30#    NR x12'  Plyometrics:  - high knees  - butt kicks  - shuffle  Tiltboard taps x30 ea ML, AP TE x30'  Reassessment  Attempt McConnel taping- pt legs too hairy  Educated on use of taping  Educating pt on findings    NR x10'  Hooklying hip abduction 5\" hold x20  Hip add iso with bridge 2x10 TE x17'  Attempting using McConnel taping and KT tape  SL shuttle press 55# x15 bilat     NMR x23'  6\" lateral step down x20 bilat  16\" plyobox set ups x10 bilat  SL STS x10 bilat  Side stepping RTB at ankles x2 laps; GTB at ankles x2 laps   TE x15'  In hallway: dynamic warm up   - HS kicks, quad stretch, forward Ts  1/2 knee hip flexor stretching on plinth 5x10\" bilat  Seated HS stretch 5x10\" bilat     NR x30'  Plyometrics:  - high knees  - butt kicks  - shuffle  - 8\" jump down x10   - 16\" jump down x10   16\" plyobox step up/down x10 bilat  Cranes 1D GTB x10 bilat   TRX SL mini squat x10  bilat  - outside- running fwd/bwd, shuffling TE x20'  In hallway: dynamic warm up   - HS kicks, quad stretch, forward Ts, knee to chest  Seated piriformis and figure 4 stretch 4x10\" ea bilat    NR x25'  SL STS 2x10 bilat  Clock lunges (lat, bwd, curtsy) 2x5 bilat  TRX SL RDL 2x10 bilat     HEP: KRBQHZDT  - Hip Flexion  - 2-3 x daily - 7 x weekly - 10 reps  - Supine Quad Set  - 2 x daily - 7 x weekly - 10 reps - 5-10 sec hold  - Seated Hamstring Set  - 2 x daily - 7 x weekly - 10 reps - 5-10 sec hold  - Seated Hamstring Stretch  - 3-4 x daily - 7 x weekly - 4 sets - 10 sec hold  - Supine Piriformis Stretch with Foot on Ground  - 2-3 x daily - 7 x weekly - 3 sets - 10 sec hold  - Supine Figure 4 Piriformis Stretch  - 2-3 x daily - 7 x weekly - 3 sets - 10 sec hold  - Seated Piriformis Stretch  - 2-3 x daily - 7 x weekly - 3 sets - 10 sec hold  - Seated Piriformis Stretch with Trunk Bend  - 2-3 x daily - 7 x weekly - 3 sets - 10 sec hold    Charges: 2 NR 1 TE       Total Timed Treatment: 45 min  Total Treatment Time: 45 min

## 2024-09-12 ENCOUNTER — OFFICE VISIT (OUTPATIENT)
Dept: PHYSICAL THERAPY | Facility: HOSPITAL | Age: 24
End: 2024-09-12
Attending: FAMILY MEDICINE
Payer: COMMERCIAL

## 2024-09-12 PROCEDURE — 97112 NEUROMUSCULAR REEDUCATION: CPT

## 2024-09-12 PROCEDURE — 97110 THERAPEUTIC EXERCISES: CPT

## 2024-09-16 ENCOUNTER — APPOINTMENT (OUTPATIENT)
Dept: ENDOCRINOLOGY | Age: 24
End: 2024-09-16

## 2024-09-16 DIAGNOSIS — R74.8 ELEVATED LIVER ENZYMES: ICD-10-CM

## 2024-09-16 PROCEDURE — 36415 COLL VENOUS BLD VENIPUNCTURE: CPT | Performed by: INTERNAL MEDICINE

## 2024-09-16 PROCEDURE — 82977 ASSAY OF GGT: CPT | Performed by: CLINICAL MEDICAL LABORATORY

## 2024-09-16 PROCEDURE — 80053 COMPREHEN METABOLIC PANEL: CPT | Performed by: CLINICAL MEDICAL LABORATORY

## 2024-09-16 NOTE — PROGRESS NOTES
Discharge Summary  Pt has attended 12 visits in Physical Therapy.    Diagnosis:   Acute pain of both knees (M25.561,M25.562)       Referring Provider: Dorene Lawson  Date of Evaluation:    7/17/2024    Precautions:   Type 1 Diabetes Next MD visit:   none scheduled  Date of Surgery: n/a     Insurance Primary/Secondary: [place] / N/A     # Auth Visits: 12 per POC           Subjective: Pt played soccer Friday and Saturday at the park. [place] good. Just feels sore than acute, sharp pain.   Pain: 0/10 R; 0/10 L      Objective: Rx: see flowsheet    Knee AROM  Flexion: R 131; L 132    Flexibility:  Ely's: R 126; L 125  SLR HS: R 70; L 75    Strength/MMT: (* denotes performed with pain)  Hip Knee   Flexion: R 5/5* (quad straining); L 5/5  Extension: R 5/5 (fasciculations); L 4+/5 (fasciculations)  Abduction: R 5/5; L 5/5  ER: R 4+/5; L 5-/5    (Fasciculations occurred on contralateral leg with MMT) Flexion: R 5-/5* (Straining in HS); L 5-/5* (straining in HS)  Extension: R 5/5 (fasciculations); L 5/5 (fasciculations)      9/17/24  Flexion: R 5/5; L 5/5  Extension: R 5/5 L 5/5   Abduction: R 5/5; L 5/5  ER: R 5/5; L 5/5 9/17/24  Flexion: R 5/5; L 5/5  Extension: R 5/5; L 5/5   (ongoing fasciculations with knee ext)       Assessment: Pt has made good improvements with PT. Noted less muscle fasciculations with MMT and his HS and quad muscle length have improved tremendously. Pt with patella iker R>L due to the history of the quad tendon tear. Encouraged pt to continue eccentric quad focused exercises and to emphasize motor control vs heavy weight lifting with poor mechanics. If symptoms return, pt to schedule appt with ortho for further evaluation.        Goals:   Pt will improve knee AROM flexion to >130 degrees to improve ability to perform soccer. met   Pt will improve quad strength to 5/5 to ascend 1 flight of stairs reciprocally without UE assist met  Pt will increase hip and knee strength to grossly 5/5 to improve  stability in the knees to perform squats. met  Pt will demonstrate squatting activities without excessive femoral IR/ADD and minimal to no knee pain. met  Pt will be independent and compliant with comprehensive HEP to maintain progress achieved in PT met    LEFS Score  LEFS Score: 68.75 % (7/16/2024 11:05 AM)    Post LEFS Score  Post LEFS Score: 85 % (9/17/2024  3:39 PM)    16.25 % improvement    Plan: DC from PT for now. If symptoms persist or worsen, schedule appointment with orthopedic doctor for further assessment.     Certification From: 9/17/2024  To:12/16/2024    Date: 8/22/24  Tx#: 8/10-12 Date: 8/27/24  Tx#: 9/10-12 Date: 9/3/24  Tx#: 10/12 Date: 9/12/24  Tx#: 11/12 Date: 9/17/24  Tx#: 12/12   TE x30'  Reassessment  Attempt McConnel taping- pt legs too hairy  Educated on use of taping  Educating pt on findings    NR x10'  Hooklying hip abduction 5\" hold x20  Hip add iso with bridge 2x10 TE x17'  Attempting using McConnel taping and KT tape  SL shuttle press 55# x15 bilat     NMR x23'  6\" lateral step down x20 bilat  16\" plyobox set ups x10 bilat  SL STS x10 bilat  Side stepping RTB at ankles x2 laps; GTB at ankles x2 laps   TE x15'  In hallway: dynamic warm up   - HS kicks, quad stretch, forward Ts  1/2 knee hip flexor stretching on plinth 5x10\" bilat  Seated HS stretch 5x10\" bilat     NR x30'  Plyometrics:  - high knees  - butt kicks  - shuffle  - 8\" jump down x10   - 16\" jump down x10   16\" plyobox step up/down x10 bilat  Cranes 1D GTB x10 bilat   TRX SL mini squat x10 bilat  - outside- running fwd/bwd, shuffling TE x20'  In hallway: dynamic warm up   - HS kicks, quad stretch, forward Ts, knee to chest  Seated piriformis and figure 4 stretch 4x10\" ea bilat    NR x25'  SL STS 2x10 bilat  Clock lunges (lat, bwd, curtsy) 2x5 bilat  TRX SL RDL 2x10 bilat   TE x28'  Reassessment- see findings above  Squat 20# x12; 30# x12; 40# x12  Seated HS stretch 5x10\" bilat  Discussing POC    NR x10'  FWD step down 6\" 2x10  bilat  Reverse nordic curl 2x10  SB wall squats x10   HEP: [id number]  - Hip Flexion  - 2-3 x daily - 7 x weekly - 10 reps  - Supine Quad Set  - 2 x daily - 7 x weekly - 10 reps - 5-10 sec hold  - Seated Hamstring Set  - 2 x daily - 7 x weekly - 10 reps - 5-10 sec hold  - Seated Hamstring Stretch  - 3-4 x daily - 7 x weekly - 4 sets - 10 sec hold  - Supine Piriformis Stretch with Foot on Ground  - 2-3 x daily - 7 x weekly - 3 sets - 10 sec hold  - Supine Figure 4 Piriformis Stretch  - 2-3 x daily - 7 x weekly - 3 sets - 10 sec hold  - Seated Piriformis Stretch  - 2-3 x daily - 7 x weekly - 3 sets - 10 sec hold  - Seated Piriformis Stretch with Trunk Bend  - 2-3 x daily - 7 x weekly - 3 sets - 10 sec hold    Charges: 1 NR 2 TE       Total Timed Treatment: 38 min  Total Treatment Time: 38 min

## 2024-09-17 ENCOUNTER — OFFICE VISIT (OUTPATIENT)
Dept: PHYSICAL THERAPY | Facility: HOSPITAL | Age: 24
End: 2024-09-17
Attending: FAMILY MEDICINE
Payer: COMMERCIAL

## 2024-09-17 LAB
ALBUMIN SERPL-MCNC: 3.8 G/DL (ref 3.6–5.1)
ALBUMIN/GLOB SERPL: 1.2 {RATIO} (ref 1–2.4)
ALP SERPL-CCNC: 66 UNITS/L (ref 45–117)
ALT SERPL-CCNC: 75 UNITS/L
ANION GAP SERPL CALC-SCNC: 11 MMOL/L (ref 7–19)
AST SERPL-CCNC: 30 UNITS/L
BILIRUB SERPL-MCNC: 0.7 MG/DL (ref 0.2–1)
BUN SERPL-MCNC: 19 MG/DL (ref 6–20)
BUN/CREAT SERPL: 22 (ref 7–25)
CALCIUM SERPL-MCNC: 9.6 MG/DL (ref 8.4–10.2)
CHLORIDE SERPL-SCNC: 103 MMOL/L (ref 97–110)
CO2 SERPL-SCNC: 28 MMOL/L (ref 21–32)
CREAT SERPL-MCNC: 0.85 MG/DL (ref 0.67–1.17)
EGFRCR SERPLBLD CKD-EPI 2021: >90 ML/MIN/{1.73_M2}
FASTING DURATION TIME PATIENT: 13 HOURS (ref 0–999)
GGT SERPL-CCNC: 16 UNITS/L (ref 15–85)
GLOBULIN SER-MCNC: 3.2 G/DL (ref 2–4)
GLUCOSE SERPL-MCNC: 219 MG/DL (ref 70–99)
POTASSIUM SERPL-SCNC: 4.6 MMOL/L (ref 3.4–5.1)
PROT SERPL-MCNC: 7 G/DL (ref 6.4–8.2)
SODIUM SERPL-SCNC: 137 MMOL/L (ref 135–145)

## 2024-09-17 PROCEDURE — 97112 NEUROMUSCULAR REEDUCATION: CPT

## 2024-09-17 PROCEDURE — 97110 THERAPEUTIC EXERCISES: CPT

## 2024-10-03 DIAGNOSIS — E10.65 TYPE 1 DIABETES MELLITUS WITH HYPERGLYCEMIA  (CMD): ICD-10-CM

## 2024-10-03 RX ORDER — INSULIN PMP CART,AUT,G6/7,CNTR
EACH SUBCUTANEOUS
Qty: 75 EACH | Refills: 0 | Status: SHIPPED | OUTPATIENT
Start: 2024-10-03

## 2024-10-04 ENCOUNTER — MED REC SCAN ONLY (OUTPATIENT)
Dept: FAMILY MEDICINE CLINIC | Facility: CLINIC | Age: 24
End: 2024-10-04

## 2025-01-02 ENCOUNTER — TELEPHONE (OUTPATIENT)
Dept: ENDOCRINOLOGY | Age: 25
End: 2025-01-02

## 2025-01-02 DIAGNOSIS — E10.65 TYPE 1 DIABETES MELLITUS WITH HYPERGLYCEMIA  (CMD): Primary | ICD-10-CM

## 2025-01-02 RX ORDER — INSULIN PMP CART,AUT,G6/7,CNTR
EACH SUBCUTANEOUS
Qty: 75 EACH | Refills: 1 | Status: SHIPPED | OUTPATIENT
Start: 2025-01-02

## 2025-01-07 ENCOUNTER — APPOINTMENT (OUTPATIENT)
Dept: ENDOCRINOLOGY | Age: 25
End: 2025-01-07

## 2025-01-07 VITALS
SYSTOLIC BLOOD PRESSURE: 126 MMHG | OXYGEN SATURATION: 98 % | DIASTOLIC BLOOD PRESSURE: 79 MMHG | BODY MASS INDEX: 28.12 KG/M2 | WEIGHT: 201.61 LBS | HEART RATE: 73 BPM | TEMPERATURE: 98 F

## 2025-01-07 DIAGNOSIS — Z96.41 PRESENCE OF HYBRID CLOSED-LOOP INSULIN PUMP SYSTEM: ICD-10-CM

## 2025-01-07 DIAGNOSIS — Z46.81 INSULIN PUMP TITRATION: ICD-10-CM

## 2025-01-07 DIAGNOSIS — E10.65 TYPE 1 DIABETES MELLITUS WITH HYPERGLYCEMIA  (CMD): Primary | ICD-10-CM

## 2025-01-07 DIAGNOSIS — R74.8 ELEVATED LIVER ENZYMES: ICD-10-CM

## 2025-01-07 DIAGNOSIS — Z97.8 USES SELF-APPLIED CONTINUOUS GLUCOSE MONITORING DEVICE: ICD-10-CM

## 2025-01-07 LAB — HBA1C MFR BLD: 6.9 % (ref 4.5–5.6)

## 2025-01-07 RX ORDER — INSULIN PMP CART,AUT,G6/7,CNTR
EACH SUBCUTANEOUS
Qty: 75 EACH | Refills: 1 | Status: SHIPPED | OUTPATIENT
Start: 2025-01-07

## 2025-01-07 RX ORDER — INSULIN ASPART 100 [IU]/ML
INJECTION, SOLUTION INTRAVENOUS; SUBCUTANEOUS
Qty: 110 ML | Refills: 1 | Status: SHIPPED | OUTPATIENT
Start: 2025-01-07

## 2025-01-07 RX ORDER — TRETINOIN 0.25 MG/G
CREAM TOPICAL
COMMUNITY
Start: 2024-12-20

## 2025-03-01 ENCOUNTER — OFFICE VISIT (OUTPATIENT)
Dept: FAMILY MEDICINE CLINIC | Facility: CLINIC | Age: 25
End: 2025-03-01
Payer: COMMERCIAL

## 2025-03-01 VITALS
DIASTOLIC BLOOD PRESSURE: 81 MMHG | BODY MASS INDEX: 28 KG/M2 | HEART RATE: 77 BPM | SYSTOLIC BLOOD PRESSURE: 127 MMHG | WEIGHT: 198 LBS

## 2025-03-01 DIAGNOSIS — E10.9 TYPE 1 DIABETES MELLITUS WITHOUT COMPLICATION (HCC): Primary | ICD-10-CM

## 2025-03-01 DIAGNOSIS — R04.0 RECURRENT EPISTAXIS: ICD-10-CM

## 2025-03-01 PROCEDURE — 3079F DIAST BP 80-89 MM HG: CPT | Performed by: FAMILY MEDICINE

## 2025-03-01 PROCEDURE — 3074F SYST BP LT 130 MM HG: CPT | Performed by: FAMILY MEDICINE

## 2025-03-01 PROCEDURE — 99214 OFFICE O/P EST MOD 30 MIN: CPT | Performed by: FAMILY MEDICINE

## 2025-03-01 NOTE — PROGRESS NOTES
Chief Complaint   Patient presents with    Epistaxis     Concerned w/ frequent nose bleeds from left nostril      HPI:   Hany Brady is a 24 year old male who presents to clinic with complaints of recurrent epistaxis affecting left nostril. Slowed down in recent weeks but has had 2 last week.   Flared up due to dry air.   Earlier in Winter was getting a couple day and with every nose wipe/kleenex.   Started using a humidifier, nasal spray. Tried vaseline.     REVIEW OF SYSTEMS:   Negative, except per HPI.     EXAM:   /81   Pulse 77   Wt 198 lb (89.8 kg)   BMI 27.62 kg/m²   Body mass index is 27.62 kg/m².  GENERAL: well developed, well nourished, in no apparent distress  SKIN: no rashes, no suspicious lesions  HEENT: atraumatic, normocephalic, no obvious bleeding vessels.  Small ulceration noted at right nostril near the tip.  No polyps visualized bilaterally.  EYES: PERRLA, EOMI,conjunctiva are clear  NECK: supple, no adenopathy    ASSESSMENT AND PLAN:     1. Type 1 diabetes mellitus without complication (HCC)  - OPHTHALMOLOGY - INTERNAL    2. Recurrent epistaxis  -Discussed supportive care at home.  Humidifier, Vaseline, pressure over nasal bridge.  Can see ENT for worsening symptoms.  - ENT Referral - [facility])     RTC if no improvement in symptoms. Red flags discussed to go to ER.     Dorene Lawson MD  3/1/2025  11:30 AM

## 2025-03-05 ENCOUNTER — OFFICE VISIT (OUTPATIENT)
Dept: OTOLARYNGOLOGY | Facility: CLINIC | Age: 25
End: 2025-03-05
Payer: COMMERCIAL

## 2025-03-05 DIAGNOSIS — R04.0 RECURRENT EPISTAXIS: Primary | ICD-10-CM

## 2025-03-05 PROCEDURE — 99203 OFFICE O/P NEW LOW 30 MIN: CPT | Performed by: STUDENT IN AN ORGANIZED HEALTH CARE EDUCATION/TRAINING PROGRAM

## 2025-03-05 PROCEDURE — 30901 CONTROL OF NOSEBLEED: CPT | Performed by: STUDENT IN AN ORGANIZED HEALTH CARE EDUCATION/TRAINING PROGRAM

## 2025-03-05 NOTE — PROGRESS NOTES
Hany Brady is a 24 year old male.   Chief Complaint   Patient presents with    Epistaxis     Patient is her for recurrent nose bleed from left nostril reports last nose bleed was 1 week ago.     HPI:   24-year-old with left-sided epistaxis somewhat new this past winter    Current Outpatient Medications   Medication Sig Dispense Refill    sertraline 25 MG Oral Tab Take 1 tablet (25 mg total) by mouth daily. (Patient taking differently: Take 1 tablet (25 mg total) by mouth in the morning and 1 tablet (25 mg total) before bedtime.) 30 tablet 0    BAQSIMI ONE PACK 3 MG/DOSE Nasal nasal powder       NOVOLOG 100 UNIT/ML Injection Solution INJECT 120 UNITS PER DAY CONTINOUSLY VIA INSULIN PUMP      Insulin Regular Human (INSULIN VIA INSULIN PUMP) Inject into the skin 4 (four) times daily before meals and nightly.      GLUCAGON EMERGENCY 1 MG Injection Kit USE 0.5 MG ONCE UTD  0    Insulin Syringe 31G X 5/16\" 0.3 ML Does not apply Misc USE D UTD  3    Glucose Blood (ADAIR CONTOUR NEXT TEST) In Vitro Strip TEST UTD 10 TIMES A DAY  2    BD PEN NEEDLE SHORT U/F 31G X 8 MM Does not apply Misc USE 6 TIMES PER DAY  6      Past Medical History:    Concussion    10/15 - no LOC, no ER visit, 2nd mild 3/16 - no LOC    Type 1 diabetes mellitus (HCC)    Varicella    disease at 18 months      Social History:  Social History     Socioeconomic History    Marital status: Single   Tobacco Use    Smoking status: Never     Passive exposure: Never    Smokeless tobacco: Never   Vaping Use    Vaping status: Never Used   Substance and Sexual Activity    Alcohol use: No    Drug use: No      History reviewed. No pertinent surgical history.      EXAM:   There were no vitals taken for this visit.    System Details   Skin Inspection - Normal.   Constitutional Overall appearance - Normal.   Head/Face Symmetric, TMJ tenderness not present    Eyes EOMI, PERRL   Right ear:  Canal clear, TM intact, no ELADIO   Left ear:  Canal clear, TM intact, no ELADIO    Nose: Septum midline, inferior turbinates not enlarged, nasal valves without collapse    Oral cavity/Oropharynx: No lesions or masses on inspection or palpation, tonsils symmetric    Neck: Soft without LAD, thyroid not enlarged  Voice clear/ no stridor   Other:      SCOPES AND PROCEDURES:     Procedures:  Control Epistaxis:  Pre-Procedure Care: Consent was obtained. Procedure/Risks were explained. Questions were answered. Correct patient identified. Correct side and site confirmed.   Control of a simple anterior nosebleed was performed. Location of the bleed was Kiesselbach's plexus. The procedure was performed on the left side.  Bleeding site/vessels were treated with AgNO3 cauterization.  Anesthesia used was topical Lidocaine/epinephrine 4%/1:35064   Patient tolerated the procedure well. Discharge instructions were discussed with the patient/parent. Epistaxis precautions were explained and understood. Use Vaseline and/or nasal saline gel to the affected area TID.     AUDIOGRAM AND IMAGING:         IMPRESSION:   1. Recurrent epistaxis       Recommendations:  -Had a prominent blood vessel of his left anterior nasal septum that was cauterized today  -Discussed postop care with ointment use and nosebleed and return precautions in detail    The patient indicates understanding of these issues and agrees to the plan.      Sharif Langston MD  3/5/2025  9:17 AM

## 2025-05-06 ENCOUNTER — APPOINTMENT (OUTPATIENT)
Dept: ENDOCRINOLOGY | Age: 25
End: 2025-05-06

## 2025-05-06 VITALS
DIASTOLIC BLOOD PRESSURE: 86 MMHG | WEIGHT: 197.75 LBS | SYSTOLIC BLOOD PRESSURE: 145 MMHG | HEART RATE: 61 BPM | OXYGEN SATURATION: 99 % | HEIGHT: 71 IN | BODY MASS INDEX: 27.69 KG/M2 | TEMPERATURE: 98.2 F

## 2025-05-06 DIAGNOSIS — Z96.41 PRESENCE OF HYBRID CLOSED-LOOP INSULIN PUMP SYSTEM: ICD-10-CM

## 2025-05-06 DIAGNOSIS — Z46.81 INSULIN PUMP TITRATION: ICD-10-CM

## 2025-05-06 DIAGNOSIS — Z97.8 USES SELF-APPLIED CONTINUOUS GLUCOSE MONITORING DEVICE: ICD-10-CM

## 2025-05-06 DIAGNOSIS — E10.65 TYPE 1 DIABETES MELLITUS WITH HYPERGLYCEMIA  (CMD): Primary | ICD-10-CM

## 2025-05-06 LAB — HBA1C MFR BLD: 7.4 % (ref 4.5–5.6)

## 2025-05-06 PROCEDURE — 3079F DIAST BP 80-89 MM HG: CPT | Performed by: INTERNAL MEDICINE

## 2025-05-06 PROCEDURE — 83036 HEMOGLOBIN GLYCOSYLATED A1C: CPT | Performed by: INTERNAL MEDICINE

## 2025-05-06 PROCEDURE — 95251 CONT GLUC MNTR ANALYSIS I&R: CPT | Performed by: INTERNAL MEDICINE

## 2025-05-06 PROCEDURE — 99214 OFFICE O/P EST MOD 30 MIN: CPT | Performed by: INTERNAL MEDICINE

## 2025-05-06 PROCEDURE — 36416 COLLJ CAPILLARY BLOOD SPEC: CPT | Performed by: INTERNAL MEDICINE

## 2025-05-06 PROCEDURE — 3077F SYST BP >= 140 MM HG: CPT | Performed by: INTERNAL MEDICINE

## 2025-05-06 RX ORDER — INSULIN ASPART 100 [IU]/ML
INJECTION, SOLUTION INTRAVENOUS; SUBCUTANEOUS
Qty: 110 ML | Refills: 1 | Status: SHIPPED | OUTPATIENT
Start: 2025-05-06

## 2025-05-30 ENCOUNTER — TELEPHONE (OUTPATIENT)
Dept: PEDIATRIC ENDOCRINOLOGY | Age: 25
End: 2025-05-30

## 2025-09-02 ENCOUNTER — APPOINTMENT (OUTPATIENT)
Dept: ENDOCRINOLOGY | Age: 25
End: 2025-09-02

## 2025-09-04 ENCOUNTER — LAB SERVICES (OUTPATIENT)
Dept: PEDIATRIC ENDOCRINOLOGY | Age: 25
End: 2025-09-04

## 2025-09-04 DIAGNOSIS — E10.65 TYPE 1 DIABETES MELLITUS WITH HYPERGLYCEMIA  (CMD): ICD-10-CM

## 2025-09-05 LAB
ALBUMIN SERPL-MCNC: 4.2 G/DL (ref 3.4–5)
ALBUMIN/GLOB SERPL: 1.2 {RATIO} (ref 1–2.4)
ALP SERPL-CCNC: 79 UNITS/L (ref 45–117)
ALT SERPL-CCNC: 23 UNITS/L
ANION GAP SERPL CALC-SCNC: 8 MMOL/L (ref 7–19)
AST SERPL-CCNC: 26 UNITS/L
BILIRUB SERPL-MCNC: 0.7 MG/DL (ref 0.2–1)
BUN SERPL-MCNC: 18 MG/DL (ref 6–20)
BUN/CREAT SERPL: 19 (ref 7–25)
CALCIUM SERPL-MCNC: 9.6 MG/DL (ref 8.4–10.2)
CHLORIDE SERPL-SCNC: 106 MMOL/L (ref 97–110)
CHOLEST SERPL-MCNC: 183 MG/DL
CHOLEST/HDLC SERPL: 2.3 {RATIO}
CO2 SERPL-SCNC: 31 MMOL/L (ref 21–32)
CREAT SERPL-MCNC: 0.96 MG/DL (ref 0.67–1.17)
CREAT UR-MCNC: 339 MG/DL
EGFRCR SERPLBLD CKD-EPI 2021: >90 ML/MIN/{1.73_M2}
FASTING DURATION TIME PATIENT: 12 HOURS (ref 0–999)
GLOBULIN SER-MCNC: 3.4 G/DL (ref 2–4)
GLUCOSE SERPL-MCNC: 84 MG/DL (ref 70–99)
HBA1C MFR BLD: 7.2 % (ref 4.5–5.6)
HDLC SERPL-MCNC: 78 MG/DL
LDLC SERPL CALC-MCNC: 98 MG/DL
MICROALBUMIN UR-MCNC: 2.17 MG/DL
MICROALBUMIN/CREAT UR: 6.4 MG/G
NONHDLC SERPL-MCNC: 105 MG/DL
POTASSIUM SERPL-SCNC: 4.2 MMOL/L (ref 3.4–5.1)
PROT SERPL-MCNC: 7.6 G/DL (ref 6.4–8.2)
SODIUM SERPL-SCNC: 141 MMOL/L (ref 135–145)
T4 FREE SERPL-MCNC: 0.8 NG/DL (ref 0.8–1.5)
TRIGL SERPL-MCNC: 36 MG/DL
TSH SERPL-ACNC: 1.81 MCUNITS/ML (ref 0.35–5)

## 2025-09-08 ENCOUNTER — APPOINTMENT (OUTPATIENT)
Dept: ENDOCRINOLOGY | Age: 25
End: 2025-09-08

## (undated) NOTE — LETTER
Name:  Yazmin Chambers School Year:  12th Grade Class: Student ID No.:   Address:  [address] [place] [address] Phone:  [phone] (home)  :  27year old   Name Relationship Lgl [address] Work Phone Home Phone Mobile Phone   1.  Kareem Barrientos 8.  Has a doctor ever told you that you have any heart problems?  (High blood pressure, murmur, high cholesterol, heart infection, Kawasaki disease, other)     9. Has a doctor ever ordered a test for your heart?      10. Do you get lightheaded or feel more 26. Do you cough, wheeze, or have difficulty breathing during or after exercise?     27. Have you ever used an inhaler or taken asthma medication? 29. Is there anyone in your family who has asthma?      34. Were you born without or are you missing a kid 55. How many periods have you had in the last 12 months? I hereby state that, to the best of my knowledge, my answers to the above questions are complete and correct.  9/2/2020    Signature of athlete: _____________________________________     [employer] [facility], TATIANA Medrano  [address] [facility]  Dept: [phone]   Physician's Signature      Physician Assistant Signature*      Advanced Nurse Practitioner's Signature*      Cirilo Chávez AP Signature of student-athlete Date Signature of parent-guardian Date        ©2010 AAFP, AAP, [address], [address] [facility] [address] for [address] [facility], [address] [facility] for [address] [place] [address]

## (undated) NOTE — LETTER
[facility] [employer] of Child Health Examination       Student's Name  Angeles Wolff Signature                                                                                                                                              Title                           Date    (If adding dates to the above immunization history section, put y

## (undated) NOTE — LETTER
[facility] [employer] of Child Health Examination       Student's Name  Markus Wolff Title                           Date    (If adding dates to the above immunization history section, put your initials by date(s) and sign here.)   ALTERNATIVE PROOF OF IMMUNITY   1 Diagnosis of asthma? Child wakes during the night coughing   Yes   No    Yes   No    Loss of function of one of paired organs? (eye/ear/kidney/testicle)   Yes   No      Birth Defects? Developmental delay? Yes   No    Yes   No  Hospitalizations? When? acanthosis nigricans)    No           At Risk  No HAS TYPE 1 DM   Lead Risk Questionnaire  Req'd for children 6 months thru 6 yrs enrolled in licensed or public school operated day care, ,  nursery school and/or  (blood test req’d if r Needs/Restrictions     None   SPECIAL INSTRUCTIONS/DEVICES e.g. safety glasses, glass eye, chest protector for arrhythmia, pacemaker, prosthetic device, dental bridge, false teeth, athleticsupport/cup     None   MENTAL HEALTH/OTHER   Is there anything else

## (undated) NOTE — LETTER
[facility] [employer] of Child Health Examination       Student's Name  Lida Wolff Signature                                                                                                                                              Title                           Date    (If adding dates to the above immunization history section, put y ALLERGIES  (Food, drug, insect, other)  Patient has no known allergies.  MEDICATION  (List all prescribed or taken on a regular basis.)  Current Outpatient Medications:   •  Glucose Blood (ADAIR CONTOUR NEXT TEST) In Vitro Strip, TEST UTD 10 TIMES A DAY, PHYSICAL EXAMINATION REQUIREMENTS    Entire section below to be completed by MD/DO/APN/PA       PHYSICAL EXAMINATION REQUIREMENTS (head circumference if <33 years old):   /74   Pulse 78   Ht 5' 9.25\" (1.759 m)   Wt 72.1 kg (159 lb)   BMI 23.31 kg/m Mouth/Dental Yes  Spinal examination Yes    Cardiovascular/HTN Yes  Nutritional status Yes    Respiratory Yes                   Diagnosis of Asthma: No Mental Health Yes        Currently Prescribed Asthma Medication:            Quick-relief  medication (e.

## (undated) NOTE — Clinical Note
I saw Carmelokenney Zamzam in the DEPARTMENT [facility] today for Hand, foot and mouth disease  (primary encounter diagnosis). he was treated with comfort care. Stacey Wyman will follow up with you if no better or as needed.  Thank you for the opportunity to ca

## (undated) NOTE — LETTER
[facility] [employer] of Child Health Examination       Student's Name  Omaira Wolff Date     Signature                                                                                                                                              Title                           Date    (If adding dates to the above immu ALLERGIES  (Food, drug, insect, other)  Patient has no known allergies.  MEDICATION  (List all prescribed or taken on a regular basis.)    Current Outpatient Medications:   •  GLUCAGON EMERGENCY 1 MG Injection Kit, USE 0.5 MG ONCE UTD, Disp: , Rfl: 0  • Dizziness or chest pain with exercise? Yes   No  Fam hx sudden death < age 48 (Cause?)    Yes   No    Eye/Vision problems?   Yes  No   Glasses  Yes   No  Contacts  Yes    No   Last eye exam___  Other concerns? (crossed eye, drooping lids, squinting, diffi {DMG_TB_SKIN_TEST:1380::\"No Test Needed\"}        Skin Test:     Date Read                  /      /              Result:  {DMG_POSITIVE_NE::\"      \"}             mm    ______________                         Blood Test:   Date Reported          / health professional, check title:  __Nurse  __Teacher  __Counselor  __Principal   EMERGENCY ACTION  needed while at school due to child's health condition (e.g., seizures, asthma, insect sting, food, peanut allergy, bleeding problem, diabetes, heart proble

## (undated) NOTE — LETTER
Name:  Irma Dwyer School Year:  10th Grade Class: Student ID No.:   Address:  [address] [place] [address] Phone:  [phone] (home)  :  15year old   Name Relationship Lgl [address] Work Phone Home Phone Mobile Phone   1.  Cleaster Lefort friends during exercise? HEART HEALTH QUESTIONS ABOUT YOUR FAMILY Yes No   13.  Has any family member or relative  of heart problems or had an unexpected or unexplained sudden death before age 48?     15. Does anyone in your family have hypertrophic 32. Do you have any rashes, pressure sores, or other skin problems? 35. Have you had a herpes or MRSA skin infection? 29. Have you ever had a head injury or concussion?      35. Have you ever had a hit or blow to the head that caused confusion, prol BMI 21.80 kg/m²  67 %ile (Z= 0.43) based on CDC 2-20 Years BMI-for-age data using vitals from 6/15/2018.  male    Vision: R 20/    L 20/   Corrected:   Yes/No   MEDICAL NORMAL ABNORMAL FINDINGS   Appearance:  Marfan stigmata (kyphoscoliosis, high-arched pa [de-identified] Assistants or Advanced Nurse Practitioners to sign off on physicals.     [facility] Substance Testing Policy Consent to Random Testing   (This section for high school students only)   6567-3111 school term     As a prerequisite to participation in [facility]

## (undated) NOTE — MR AVS SNAPSHOT
[address]  [address] [facility]  [phone]               Thank you for choosing us for your health care visit with ZEFERINO Mauricio.   We are glad to serve you and happy to provide you with this summa Here are some topics you, your teen, and the healthcare provider may want to discuss during this visit:  · School performance. How is your child doing in school? Is homework finished on time? Does your child stay organized?  These are skills you can help wi interest in dating and becoming “more than friends” with other kids. Also, it’s normal for your teen to be jimenez. Try to be patient and consistent. Encourage conversations, even when he or she doesn’t seem to want to talk.  No matter how your teen acts, he family time. It also lets you see what and how your child eats.   · Limit soda and juice drinks. A small soda is OK once in a while. But soda, sports drinks, and juice drinks are no substitute for healthier drinks. Sports and juice drinks are no better.  Wa phone, text, or listen to music with headphones while he or she is riding a bike or walking outdoors, especially when crossing the street. · Constant loud music can cause hearing damage, so monitor your teen’s music volume.  Many music players let you set · Sudden changes in eating or sleeping habits  · Sexual promiscuity or unplanned pregnancy  · Hostile behavior or rage  · Loss of pleasure in life  Depressed teens can be helped with treatment. Talk to your child’s healthcare provider.  Or check with your l Call [phone] for help. MyChart is NOT to be used for urgent needs. For medical emergencies, dial 911.             Educational Information     Healthy Active Living  An initiative of the American Academy of Pediatrics    Fact Sheet: Healthy Active Help your children form healthy habits. Healthy active children are more likely to be healthy active adults!              Visit [facility] online at  [email or web address]

## (undated) NOTE — Clinical Note
[facility] [email or web address] of Child Health Examination       Student's Name  Luz Sessions Birth Title                           Date    (If adding dates to the above immunization history section, put your initials by date(s) and sign here.)   ALTERNATIVE PROOF OF IMMUNITY   1.Clinical diagnosis (measles, mumps, hepatits B) is allowed when verified b Yes       No    Loss of function of one of paired organs? (eye/ear/kidney/testicle)   Yes       No      Birth Defects? Developmental delay? Yes       No    Yes       No  Hospitalizations? When? What for? Yes       No    Blood disorders?   Hemophili Signs of Insulin Resistance (hypertension, dyslipidemia, polycystic ovarian syndrome, acanthosis nigricans)                           At Risk     Lead Risk Questionnaire  Req'd for children 6 months thru 6 yrs enrolled in licensed or public school op Needs/Restrictions     None   SPECIAL INSTRUCTIONS/DEVICES e.g. safety glasses, glass eye, chest protector for arrhythmia, pacemaker, prosthetic device, dental bridge, false teeth, athleticsupport/cup     None   MENTAL HEALTH/OTHER   Is there anything else

## (undated) NOTE — LETTER
VACCINE ADMINISTRATION RECORD  PARENT / GUARDIAN APPROVAL  Date: 2019  Vaccine administered to: Kallie Mathew     : 2000    MRN: [id number]    A copy of the appropriate Centers for Disease Control and Prevention Vaccine Information stateme

## (undated) NOTE — LETTER
11/16/2023              [address]     Hello,     [facility] records indicate you are due for the following:     Diabetic A1C follow up   Diabetic Eye Exam   Diabetic Foot Exam   (Referrals for testing will be ordered at time of physical visit)     If changed to a new doctor at another facility please notify the clinic to update in system. Please call [phone] to schedule your appointment or schedule a physical through [facility]. It's very important to have your routine check ups and testing for the care of your health. If you had any recent testing at another facility, please call to have their office fax results to [phone]. Thank you have a great day.      Sincerely,     Demetrius Connor, [address], DO   [facility], [place], [address] [facility] [id number] [phone]

## (undated) NOTE — LETTER
5/15/2019              Hany Brady        [facility]         To Whom it may concern: This is to certify that Jacqueline Garcia had an appointment on 5/15/2019 with PITER Coulter.   Please excuse his late arr

## (undated) NOTE — LETTER
[facility] [employer] of Child Health Examination       Student's Name  Joe Wolff APRN                   Date  7/17/2019   Signature                                                                                                                                              Title                           Date    (If adding dates to t VERIFIED BY HEALTH CARE PROVIDER    ALLERGIES  (Food, drug, insect, other)  Patient has no known allergies.  MEDICATION  (List all prescribed or taken on a regular basis.)  Current Outpatient Medications:   •  Glucose Blood (ADAIR CONTOUR NEXT TEST) In Vitr PHYSICAL EXAMINATION REQUIREMENTS    Entire section below to be completed by MD/DO/APN/PA       PHYSICAL EXAMINATION REQUIREMENTS (head circumference if <33 years old):   /74   Pulse 78   Ht 5' 9.25\" (1.759 m)   Wt 72.1 kg (159 lb)   BMI 23.31 kg/m Mouth/Dental Yes  Spinal examination Yes    Cardiovascular/HTN Yes  Nutritional status Yes    Respiratory Yes                   Diagnosis of Asthma: No Mental Health Yes        Currently Prescribed Asthma Medication:            Quick-relief  medication (e.

## (undated) NOTE — ED AVS SNAPSHOT
[facility] AND [facility] in [facility].  [address] [facility]    Phone:  [phone]    Fax:  [address] [facility] Jose Latham   MRN: [id number]    Department:  [facility] AND [facility] in [address] [place] [address]   Date of Visit It is our goal to assure that you are completely satisfied with every aspect of your visit today.   In an effort to constantly improve our service to you, we would appreciate any positive or negative feedback related to the care you received in our [employer] account. You may have had testing done that requires us to contact you. Please make sure we have your correct phone number on file.      OUR CURRENT HOURS OF OPERATION:  MONDAY THROUGH FRIDAY 8AM - 8PM  WEEKENDS AND HOLIDAYS 8AM - 6PM    I certifi visit, view other health information and more. To sign up or find more information on getting   Proxy Access to your child’s MyChart go to [email or web address] and click on the   Sign Up Forms link in the Additional Information box on the right.

## (undated) NOTE — Clinical Note
VACCINE ADMINISTRATION RECORD  PARENT / GUARDIAN APPROVAL  Date: 2017  Vaccine administered to: Dre Velasquez     : 2000    MRN: [id number]    A copy of the appropriate Centers for Disease Control and Prevention Vaccine Information stateme

## (undated) NOTE — LETTER
[address] [facility]  Dept: [phone]  Dept Fax: [phone]  Loc: [phone]      February 8, 2017    Patient: Med Cuellar   Date of Visit: 2/8/2017       To Whom It May Concern:     Constance

## (undated) NOTE — LETTER
[facility] [place] [employer] Child Health Examination       Student's Name  Patrice How Birth APRN                  Date  7/17/2019   Signature                                                                                                                                              Title                           Date    (If adding dates to t VERIFIED BY HEALTH CARE PROVIDER    ALLERGIES  (Food, drug, insect, other)  Patient has no known allergies.  MEDICATION  (List all prescribed or taken on a regular basis.)    Current Outpatient Medications:   •  Insulin Syringe 31G X 5/16\" 0.3 ML Does not reading) Dental:  ____Braces    ____Bridge    ____Plate    ____Other  Other concerns? Ear/Hearing problems? Yes   No  Information may be shared with appropriate personnel for health /educational purposes. Bone/Joint problem/injury/scoliosis?    Yes Urinalysis   Developmental Screening Tool     SYSTEM REVIEW Normal Comments/Follow-up/Needs  Normal Comments/Follow-up/Needs   Skin Yes  Endocrine Yes    Ears Yes                      Screen result: Gastrointestinal Yes    Eyes Yes     Screen result:   [facility], [address], [place] [address], [address] [facility] [address]  [phone]   Rev 11/15                                                                    Printed by the [employer]